# Patient Record
Sex: FEMALE | Race: WHITE | NOT HISPANIC OR LATINO | URBAN - METROPOLITAN AREA
[De-identification: names, ages, dates, MRNs, and addresses within clinical notes are randomized per-mention and may not be internally consistent; named-entity substitution may affect disease eponyms.]

---

## 2020-10-28 ENCOUNTER — OFFICE VISIT (OUTPATIENT)
Dept: URGENT CARE | Age: 35
End: 2020-10-28
Payer: COMMERCIAL

## 2020-10-28 ENCOUNTER — TRANSCRIBE ORDERS (OUTPATIENT)
Dept: URGENT CARE | Age: 35
End: 2020-10-28

## 2020-10-28 VITALS
SYSTOLIC BLOOD PRESSURE: 124 MMHG | HEIGHT: 61 IN | DIASTOLIC BLOOD PRESSURE: 76 MMHG | HEART RATE: 92 BPM | TEMPERATURE: 96.6 F | BODY MASS INDEX: 31.53 KG/M2 | RESPIRATION RATE: 18 BRPM | OXYGEN SATURATION: 97 % | WEIGHT: 167 LBS

## 2020-10-28 DIAGNOSIS — J06.9 VIRAL UPPER RESPIRATORY TRACT INFECTION: Primary | ICD-10-CM

## 2020-10-28 DIAGNOSIS — Z11.59 SPECIAL SCREENING EXAMINATION FOR UNSPECIFIED VIRAL DISEASE: Primary | ICD-10-CM

## 2020-10-28 PROCEDURE — 99203 OFFICE O/P NEW LOW 30 MIN: CPT | Performed by: FAMILY MEDICINE

## 2020-10-28 PROCEDURE — U0003 INFECTIOUS AGENT DETECTION BY NUCLEIC ACID (DNA OR RNA); SEVERE ACUTE RESPIRATORY SYNDROME CORONAVIRUS 2 (SARS-COV-2) (CORONAVIRUS DISEASE [COVID-19]), AMPLIFIED PROBE TECHNIQUE, MAKING USE OF HIGH THROUGHPUT TECHNOLOGIES AS DESCRIBED BY CMS-2020-01-R: HCPCS | Performed by: FAMILY MEDICINE

## 2020-10-28 PROCEDURE — G0382 LEV 3 HOSP TYPE B ED VISIT: HCPCS | Performed by: FAMILY MEDICINE

## 2020-10-28 PROCEDURE — 99283 EMERGENCY DEPT VISIT LOW MDM: CPT | Performed by: FAMILY MEDICINE

## 2020-10-28 RX ORDER — INSULIN GLARGINE 100 [IU]/ML
INJECTION, SOLUTION SUBCUTANEOUS
COMMUNITY

## 2020-10-28 RX ORDER — VENLAFAXINE HYDROCHLORIDE 150 MG/1
150 CAPSULE, EXTENDED RELEASE ORAL DAILY
COMMUNITY

## 2020-10-28 RX ORDER — SIMVASTATIN 20 MG
20 TABLET ORAL
COMMUNITY

## 2020-10-28 RX ORDER — LISINOPRIL 10 MG/1
10 TABLET ORAL DAILY
COMMUNITY

## 2020-10-30 LAB — SARS-COV-2 RNA SPEC QL NAA+PROBE: NOT DETECTED

## 2022-08-12 ENCOUNTER — OFFICE VISIT (OUTPATIENT)
Dept: GASTROENTEROLOGY | Facility: CLINIC | Age: 37
End: 2022-08-12
Payer: COMMERCIAL

## 2022-08-12 VITALS
WEIGHT: 157 LBS | HEIGHT: 61 IN | TEMPERATURE: 98.3 F | SYSTOLIC BLOOD PRESSURE: 120 MMHG | DIASTOLIC BLOOD PRESSURE: 80 MMHG | OXYGEN SATURATION: 98 % | BODY MASS INDEX: 29.64 KG/M2 | HEART RATE: 72 BPM

## 2022-08-12 DIAGNOSIS — R11.0 NAUSEA: ICD-10-CM

## 2022-08-12 DIAGNOSIS — K86.1 OTHER CHRONIC PANCREATITIS (HCC): ICD-10-CM

## 2022-08-12 DIAGNOSIS — K80.20 GALLSTONES: ICD-10-CM

## 2022-08-12 DIAGNOSIS — R10.13 EPIGASTRIC PAIN: ICD-10-CM

## 2022-08-12 DIAGNOSIS — K85.90 RECURRENT PANCREATITIS: Primary | ICD-10-CM

## 2022-08-12 PROCEDURE — 99204 OFFICE O/P NEW MOD 45 MIN: CPT | Performed by: INTERNAL MEDICINE

## 2022-08-12 RX ORDER — PEN NEEDLE, DIABETIC 31 GX5/16"
NEEDLE, DISPOSABLE MISCELLANEOUS
COMMUNITY
Start: 2022-07-19

## 2022-08-12 RX ORDER — PANTOPRAZOLE SODIUM 40 MG/1
40 TABLET, DELAYED RELEASE ORAL DAILY
Qty: 30 TABLET | Refills: 0 | Status: SHIPPED | OUTPATIENT
Start: 2022-08-12 | End: 2022-10-27

## 2022-08-12 RX ORDER — FLASH GLUCOSE SENSOR
KIT MISCELLANEOUS
COMMUNITY
Start: 2022-06-29

## 2022-08-12 RX ORDER — CHLORAL HYDRATE 500 MG
1 CAPSULE ORAL 2 TIMES DAILY
COMMUNITY
Start: 2022-02-08 | End: 2023-02-08

## 2022-08-12 RX ORDER — INSULIN LISPRO 100 [IU]/ML
INJECTION, SOLUTION INTRAVENOUS; SUBCUTANEOUS
COMMUNITY
Start: 2022-07-19

## 2022-08-12 RX ORDER — ATORVASTATIN CALCIUM 40 MG/1
40 TABLET, FILM COATED ORAL
COMMUNITY
Start: 2022-05-08

## 2022-08-12 RX ORDER — ONDANSETRON 4 MG/1
4 TABLET, ORALLY DISINTEGRATING ORAL EVERY 6 HOURS PRN
Qty: 20 TABLET | Refills: 0 | Status: SHIPPED | OUTPATIENT
Start: 2022-08-12 | End: 2022-09-27 | Stop reason: SDUPTHER

## 2022-08-12 NOTE — PATIENT INSTRUCTIONS
Scheduled date of EGD/EUS(as of today):09 16 22  Physician performing EGD:DR VANEGAS [facility]  Location of EGD/EUS:[facility]  Instructions reviewed with patient by:PARAG  Clearances:  N/A  Ok'd by Cari Ordaz

## 2022-08-12 NOTE — PROGRESS NOTES
[facility] - Outpatient Consultation  Diane Hays 39 y o  female MRN: [id number]  Encounter: [id number]          ASSESSMENT AND PLAN:      1  Recurrent pancreatitis  2  Abdominal pain   3  Nausea  4  Cholelithiasis     Patient has had couple of episodes of acute pancreatitis in the last month  No history of alcohol abuse  She does have gallstones  Will check triglyceride and IgG 4 levels  She has lost 10 lb in the last 1-2 months  Will get endoscopy ultrasound in 2-4 weeks evaluate pancreas and ducts  Her to General surgery for cholecystectomy evaluation  Will start patient on omeprazole 40 mg daily for any gastritis/ulceration in the stomach  Give Zofran p r n  for nausea  Normal QTC interval   Will order EGD to evaluate further  RTC 3 months  dAelso Swanson MD  Gastroenterology  [address]  Date: August 12, 2022    - EGD; Future  - Endoscopic ultrasonography, GI (Upper); Future  - ondansetron (Zofran ODT) 4 mg disintegrating tablet; Take 1 tablet (4 mg total) by mouth every 6 (six) hours as needed for nausea or vomiting  Dispense: 20 tablet; Refill: 0  - pantoprazole (PROTONIX) 40 mg tablet; Take 1 tablet (40 mg total) by mouth daily  Dispense: 30 tablet; Refill: 0  - Ambulatory Referral to General Surgery; Future  - IgG 1, 2, 3, and 4; Future  - Triglycerides; Future      ______________________________________________________________________    HPI:  28-year-old with recurrent acute pancreatitis, cholelithiasis, diabetes presents for evaluation  Patient reports that she had 2 episodes of acute pancreatitis in the last 1 month  No etiology was found  She does have gallstones  She says that since the last episode about 2 weeks ago her abdominal pain in the epigastric area is improving  She has made dietary modifications  Her nausea is intermittent    She has had hard to pass stools since the 1st episode of pancreatitis and has been taking stool softeners  No blood in stool  She reports 10 lb weight loss in the last 1-2 month while she has been having low oral intake  No family history of colon or pancreas cancer  Current smoker  Occasional alcohol intake  No NSAID intake  She has been on lisinopril for the last 2 years  Report of CT abdomen without contrast done 2 weeks ago shows acute pancreatitis, pancreatic calcifications, no pancreatic duct or CBD dilation  Cholelithiasis was seen  Labs done during the episode of pancreatitis showed lipase 709, normal LFTs and CBC  REVIEW OF SYSTEMS:    CONSTITUTIONAL: Denies any fever, chills, rigors, and weight loss  HEENT: No earache or tinnitus  Denies hearing loss or visual disturbances  CARDIOVASCULAR: No chest pain or palpitations  RESPIRATORY: Denies any cough, hemoptysis, shortness of breath or dyspnea on exertion  GASTROINTESTINAL: As noted in the History of Present Illness  GENITOURINARY: No problems with urination  Denies any hematuria or dysuria  NEUROLOGIC: No dizziness or vertigo, denies headaches  MUSCULOSKELETAL: Denies any muscle or joint pain  SKIN: Denies skin rashes or itching  ENDOCRINE: Denies excessive thirst  Denies intolerance to heat or cold  PSYCHOSOCIAL: Denies depression or anxiety  Denies any recent memory loss  Historical Information   Past Medical History:   Diagnosis Date    Bell's palsy     Diabetes mellitus ([facility] [address] [facility]     High cholesterol     Hypertension      Past Surgical History:   Procedure Laterality Date    CARPAL TUNNEL RELEASE       SECTION       Social History   Social History     Substance and Sexual Activity   Alcohol Use Yes     Social History     Substance and Sexual Activity   Drug Use Never     Social History     Tobacco Use   Smoking Status Current Every Day Smoker    Packs/day: 0 50   Smokeless Tobacco Never Used     History reviewed  No pertinent family history      Meds/Allergies       Current Outpatient Medications:     atorvastatin (LIPITOR) 40 mg tablet    B-D UF III MINI PEN NEEDLES 31G X 5 MM MISC    Continuous Blood Gluc Sensor (FreeStyle Maxwell 14 Day Sensor) MISC    Diclofenac Sodium (VOLTAREN) 1 %    insulin glargine (LANTUS) 100 units/mL subcutaneous injection    insulin lispro (HumaLOG) 100 units/mL injection pen    Levonorgestrel 20 1 MCG/DAY IUD    lisinopril (ZESTRIL) 10 mg tablet    metFORMIN (GLUCOPHAGE) 1000 MG tablet    Omega-3 Fatty Acids (fish oil) 1,000 mg    ondansetron (Zofran ODT) 4 mg disintegrating tablet    pantoprazole (PROTONIX) 40 mg tablet    simvastatin (ZOCOR) 20 mg tablet    venlafaxine (EFFEXOR-XR) 150 mg 24 hr capsule    Allergies   Allergen Reactions    Penicillins Anaphylaxis, Angioedema, Other (See Comments) and Hives     Childhood rxn  unsure  Apnea      Erythromycin Other (See Comments)     Unknown, per pt can tolerate zpak      Sulfamethoxazole-Trimethoprim Diarrhea and Other (See Comments)     unknown    Bee Pollen Swelling     itching    Grapefruit Extract - Food Allergy Angioedema    Pollen Extract Allergic Rhinitis    Diazepam Rash    Shellfish Allergy - Food Allergy Rash           Objective     Blood pressure 120/80, pulse 72, temperature 98 3 °F (36 8 °C), temperature source Tympanic, height 5' 1" (1 549 m), weight 71 2 kg (157 lb), SpO2 98 %  Body mass index is 29 66 kg/m²  PHYSICAL EXAM:      General Appearance:   Alert, cooperative, no distress   HEENT:   Normocephalic, atraumatic, anicteric      Neck:  Supple, symmetrical, trachea midline   Lungs:   Clear to auscultation bilaterally; no rales, rhonchi or wheezing; respirations unlabored    Heart[de-identified]   Regular rate and rhythm; no murmur, rub, or gallop     Abdomen:   Soft, non-tender, non-distended; normal bowel sounds; no masses, no organomegaly    Genitalia:   Deferred    Rectal:   Deferred    Extremities:  No cyanosis, clubbing or edema    Pulses:  2+ and symmetric    Skin:  No jaundice, rashes, or lesions    Lymph nodes:  No palpable cervical lymphadenopathy        Lab Results:   No visits with results within 1 Day(s) from this visit  Latest known visit with results is:   Transcribe Orders on 10/28/2020   Component Date Value    SARS-CoV-2  10/28/2020 Not Detected          Radiology Results:   No results found

## 2022-08-30 ENCOUNTER — CONSULT (OUTPATIENT)
Dept: SURGERY | Facility: CLINIC | Age: 37
End: 2022-08-30
Payer: COMMERCIAL

## 2022-08-30 VITALS
BODY MASS INDEX: 28.96 KG/M2 | HEART RATE: 79 BPM | SYSTOLIC BLOOD PRESSURE: 108 MMHG | TEMPERATURE: 97.6 F | DIASTOLIC BLOOD PRESSURE: 72 MMHG | HEIGHT: 61 IN | WEIGHT: 153.4 LBS

## 2022-08-30 DIAGNOSIS — K81.9 CHOLECYSTITIS: Primary | ICD-10-CM

## 2022-08-30 PROCEDURE — 99204 OFFICE O/P NEW MOD 45 MIN: CPT | Performed by: SURGERY

## 2022-08-30 NOTE — ASSESSMENT & PLAN NOTE
She has documented gallstones on both CT scan and ultrasound and this is most likely the cause of her pancreatitis seems reasonable to proceed with cholecystectomy  I discussed the procedure in detail including risks, benefits, alternatives, and what to expect postoperatively  Talked to her about doing this either laparoscopically or robotically  I told her we should wait to see what the in scopic ultrasound shows before going ahead  She understands and is agreeable      Plan:  Laparoscopic or robotic cholecystectomy

## 2022-08-30 NOTE — LETTER
August 30, 2022     Kriss Sheridan MD  [address] [place] [address]    Patient: Sarah Dill   YOB: 1985   Date of Visit: 8/30/2022       Dear Dr Jesse Bridges: Thank you for referring Sarah Dill to me for evaluation  Below are my notes for this consultation  If you have questions, please do not hesitate to call me  I look forward to following your patient along with you  Sincerely,        Ashly Shoemaker MD        CC: No Recipients  Ashly Shoemaker MD  8/30/2022 11:34 AM  Sign when Signing Visit  Office Visit - General Surgery  Sarah Dill MRN: [id number]  Encounter: [id number]    Assessment and Plan  Problem List Items Addressed This Visit        Digestive    Cholecystitis - Primary     She has documented gallstones on both CT scan and ultrasound and this is most likely the cause of her pancreatitis seems reasonable to proceed with cholecystectomy  I discussed the procedure in detail including risks, benefits, alternatives, and what to expect postoperatively  Talked to her about doing this either laparoscopically or robotically  I told her we should wait to see what the in scopic ultrasound shows before going ahead  She understands and is agreeable  Plan:  Laparoscopic or robotic cholecystectomy               Chief Complaint:  Sarah Dill is a 39 y o  female who presents for Cholelithiasis (Consult gallbladder )    Subjective  39year old female who has had 2 bouts of pancreatitis in last 3 months  She had been evaluated with CT scans and ultrasounds which shows gallstones  Cholelithiasis is a diagnosis for the pancreatitis  When she is intact severe epigastric through into her back associated with nausea and vomiting  She notices any fatty foods will cause her discomfort at this time    She was recently seen by GI and plans were made for an endoscopic ultrasound to evaluate her pancreas more full    Past Medical History:   Diagnosis Date    Bell's palsy     Diabetes mellitus ([facility] [address] [facility]     High cholesterol     Hypertension        Past Surgical History:   Procedure Laterality Date    CARPAL TUNNEL RELEASE       SECTION  2006       History reviewed  No pertinent family history  Social History     Tobacco Use    Smoking status: Current Every Day Smoker     Packs/day: 0 50    Smokeless tobacco: Never Used   Vaping Use    Vaping Use: Never used   Substance Use Topics    Alcohol use: Yes    Drug use: Never        Medications  Current Outpatient Medications on File Prior to Visit   Medication Sig Dispense Refill    atorvastatin (LIPITOR) 40 mg tablet Take 40 mg by mouth daily with dinner      B-D UF III MINI PEN NEEDLES 31G X 5 MM MISC 5XDAY      Continuous Blood Gluc Sensor (FreeStyle Maxwell 14 Day Sensor) MISC INJECT 1 EACH UNDER THE SKIN EVERY 14 (FOURTEEN) DAYS   Diclofenac Sodium (VOLTAREN) 1 % Apply 2 g topically 4 (four) times a day      insulin glargine (LANTUS) 100 units/mL subcutaneous injection Inject under the skin daily at bedtime      insulin lispro (HumaLOG) 100 units/mL injection pen 12 units three times a day  ISF 20 Target 120      Levonorgestrel 20 1 MCG/DAY IUD 1 each by Intrauterine route      lisinopril (ZESTRIL) 10 mg tablet Take 10 mg by mouth daily      metFORMIN (GLUCOPHAGE) 1000 MG tablet Take 1,000 mg by mouth 2 (two) times a day with meals      Omega-3 Fatty Acids (fish oil) 1,000 mg Take 1 g by mouth 2 (two) times a day      ondansetron (Zofran ODT) 4 mg disintegrating tablet Take 1 tablet (4 mg total) by mouth every 6 (six) hours as needed for nausea or vomiting 20 tablet 0    pantoprazole (PROTONIX) 40 mg tablet Take 1 tablet (40 mg total) by mouth daily 30 tablet 0    simvastatin (ZOCOR) 20 mg tablet Take 20 mg by mouth daily at bedtime      venlafaxine (EFFEXOR-XR) 150 mg 24 hr capsule Take 150 mg by mouth daily       No current facility-administered medications on file prior to visit  Allergies  Allergies   Allergen Reactions    Penicillins Anaphylaxis, Angioedema, Other (See Comments) and Hives     Childhood rxn  unsure  Apnea      Erythromycin Other (See Comments)     Unknown, per pt can tolerate zpak      Sulfamethoxazole-Trimethoprim Diarrhea and Other (See Comments)     unknown    Bee Pollen Swelling     itching    Grapefruit Extract - Food Allergy Angioedema    Pollen Extract Allergic Rhinitis    Diazepam Rash    Shellfish Allergy - Food Allergy Rash       Review of Systems   Constitutional: Negative for chills and fever  HENT: Negative for trouble swallowing and voice change  Eyes: Negative for pain and visual disturbance  Respiratory: Negative for cough and shortness of breath  Cardiovascular: Negative for chest pain and leg swelling  Gastrointestinal: Negative for abdominal pain, nausea and vomiting  Endocrine: Negative for cold intolerance, heat intolerance, polydipsia, polyphagia and polyuria  Genitourinary: Negative for difficulty urinating and flank pain  Musculoskeletal: Negative for arthralgias and gait problem  Skin: Negative for rash and wound  Allergic/Immunologic: Negative for food allergies  Neurological: Negative for seizures, syncope, weakness and headaches  Hematological: Negative for adenopathy  Psychiatric/Behavioral: Negative for confusion  All other systems reviewed and are negative  Objective  Vitals:    08/30/22 0939   BP: 108/72   Pulse: 79   Temp: 97 6 °F (36 4 °C)       Physical Exam  Vitals and nursing note reviewed  Constitutional:       General: She is not in acute distress  Appearance: She is well-developed  She is not diaphoretic  HENT:      Head: Normocephalic and atraumatic  Right Ear: External ear normal       Left Ear: External ear normal    Eyes:      General: No scleral icterus  Conjunctiva/sclera: Conjunctivae normal    Neck:      Thyroid: No thyromegaly        Trachea: No tracheal deviation  Cardiovascular:      Rate and Rhythm: Normal rate and regular rhythm  Heart sounds: Normal heart sounds  No murmur heard  No friction rub  Pulmonary:      Effort: Pulmonary effort is normal  No respiratory distress  Breath sounds: Normal breath sounds  No wheezing or rales  Abdominal:      General: There is no distension  Palpations: Abdomen is soft  There is no mass  Tenderness: There is no abdominal tenderness  There is no guarding or rebound  Musculoskeletal:         General: Normal range of motion  Cervical back: Neck supple  Lymphadenopathy:      Cervical: No cervical adenopathy  Skin:     General: Skin is warm and dry  Neurological:      Mental Status: She is alert and oriented to person, place, and time  Psychiatric:         Behavior: Behavior normal          Thought Content:  Thought content normal          Judgment: Judgment normal

## 2022-08-30 NOTE — PROGRESS NOTES
Office Visit - General Surgery  Jose Antonio Hernandez MRN: [id number]  Encounter: [id number]    Assessment and Plan  Problem List Items Addressed This Visit        Digestive    Cholecystitis - Primary     She has documented gallstones on both CT scan and ultrasound and this is most likely the cause of her pancreatitis seems reasonable to proceed with cholecystectomy  I discussed the procedure in detail including risks, benefits, alternatives, and what to expect postoperatively  Talked to her about doing this either laparoscopically or robotically  I told her we should wait to see what the in scopic ultrasound shows before going ahead  She understands and is agreeable  Plan:  Laparoscopic or robotic cholecystectomy               Chief Complaint:  Jose Antonio Hernandez is a 39 y o  female who presents for Cholelithiasis (Consult gallbladder )    Subjective  39year old female who has had 2 bouts of pancreatitis in last 3 months  She had been evaluated with CT scans and ultrasounds which shows gallstones  Cholelithiasis is a diagnosis for the pancreatitis  When she is intact severe epigastric through into her back associated with nausea and vomiting  She notices any fatty foods will cause her discomfort at this time  She was recently seen by GI and plans were made for an endoscopic ultrasound to evaluate her pancreas more full    Past Medical History:   Diagnosis Date    Bell's palsy     Diabetes mellitus ([facility] [address]     High cholesterol     Hypertension        Past Surgical History:   Procedure Laterality Date    CARPAL TUNNEL RELEASE       SECTION         History reviewed  No pertinent family history  Social History     Tobacco Use    Smoking status: Current Every Day Smoker     Packs/day: 0 50    Smokeless tobacco: Never Used   Vaping Use    Vaping Use: Never used   Substance Use Topics    Alcohol use:  Yes    Drug use: Never        Medications  Current Outpatient Medications on File Prior to Visit Medication Sig Dispense Refill    atorvastatin (LIPITOR) 40 mg tablet Take 40 mg by mouth daily with dinner      B-D UF III MINI PEN NEEDLES 31G X 5 MM MISC 5XDAY      Continuous Blood Gluc Sensor (FreeStyle Maxwell 14 Day Sensor) MISC INJECT 1 EACH UNDER THE SKIN EVERY 14 (FOURTEEN) DAYS   Diclofenac Sodium (VOLTAREN) 1 % Apply 2 g topically 4 (four) times a day      insulin glargine (LANTUS) 100 units/mL subcutaneous injection Inject under the skin daily at bedtime      insulin lispro (HumaLOG) 100 units/mL injection pen 12 units three times a day  ISF 20 Target 120      Levonorgestrel 20 1 MCG/DAY IUD 1 each by Intrauterine route      lisinopril (ZESTRIL) 10 mg tablet Take 10 mg by mouth daily      metFORMIN (GLUCOPHAGE) 1000 MG tablet Take 1,000 mg by mouth 2 (two) times a day with meals      Omega-3 Fatty Acids (fish oil) 1,000 mg Take 1 g by mouth 2 (two) times a day      ondansetron (Zofran ODT) 4 mg disintegrating tablet Take 1 tablet (4 mg total) by mouth every 6 (six) hours as needed for nausea or vomiting 20 tablet 0    pantoprazole (PROTONIX) 40 mg tablet Take 1 tablet (40 mg total) by mouth daily 30 tablet 0    simvastatin (ZOCOR) 20 mg tablet Take 20 mg by mouth daily at bedtime      venlafaxine (EFFEXOR-XR) 150 mg 24 hr capsule Take 150 mg by mouth daily       No current facility-administered medications on file prior to visit         Allergies  Allergies   Allergen Reactions    Penicillins Anaphylaxis, Angioedema, Other (See Comments) and Hives     Childhood rxn  unsure  Apnea      Erythromycin Other (See Comments)     Unknown, per pt can tolerate zpak      Sulfamethoxazole-Trimethoprim Diarrhea and Other (See Comments)     unknown    Bee Pollen Swelling     itching    Grapefruit Extract - Food Allergy Angioedema    Pollen Extract Allergic Rhinitis    Diazepam Rash    Shellfish Allergy - Food Allergy Rash       Review of Systems   Constitutional: Negative for chills and fever    HENT: Negative for trouble swallowing and voice change  Eyes: Negative for pain and visual disturbance  Respiratory: Negative for cough and shortness of breath  Cardiovascular: Negative for chest pain and leg swelling  Gastrointestinal: Negative for abdominal pain, nausea and vomiting  Endocrine: Negative for cold intolerance, heat intolerance, polydipsia, polyphagia and polyuria  Genitourinary: Negative for difficulty urinating and flank pain  Musculoskeletal: Negative for arthralgias and gait problem  Skin: Negative for rash and wound  Allergic/Immunologic: Negative for food allergies  Neurological: Negative for seizures, syncope, weakness and headaches  Hematological: Negative for adenopathy  Psychiatric/Behavioral: Negative for confusion  All other systems reviewed and are negative  Objective  Vitals:    08/30/22 0939   BP: 108/72   Pulse: 79   Temp: 97 6 °F (36 4 °C)       Physical Exam  Vitals and nursing note reviewed  Constitutional:       General: She is not in acute distress  Appearance: She is well-developed  She is not diaphoretic  HENT:      Head: Normocephalic and atraumatic  Right Ear: External ear normal       Left Ear: External ear normal    Eyes:      General: No scleral icterus  Conjunctiva/sclera: Conjunctivae normal    Neck:      Thyroid: No thyromegaly  Trachea: No tracheal deviation  Cardiovascular:      Rate and Rhythm: Normal rate and regular rhythm  Heart sounds: Normal heart sounds  No murmur heard  No friction rub  Pulmonary:      Effort: Pulmonary effort is normal  No respiratory distress  Breath sounds: Normal breath sounds  No wheezing or rales  Abdominal:      General: There is no distension  Palpations: Abdomen is soft  There is no mass  Tenderness: There is no abdominal tenderness  There is no guarding or rebound  Musculoskeletal:         General: Normal range of motion        Cervical back: Neck supple  Lymphadenopathy:      Cervical: No cervical adenopathy  Skin:     General: Skin is warm and dry  Neurological:      Mental Status: She is alert and oriented to person, place, and time  Psychiatric:         Behavior: Behavior normal          Thought Content:  Thought content normal          Judgment: Judgment normal

## 2022-09-01 LAB — HBA1C MFR BLD HPLC: 9.7 %

## 2022-09-13 ENCOUNTER — TELEPHONE (OUTPATIENT)
Dept: OTHER | Facility: OTHER | Age: 37
End: 2022-09-13

## 2022-09-13 NOTE — TELEPHONE ENCOUNTER
Patient is calling to have a letter for her employer sent to:  [email or web address] with absence for date of gall bladder surgery and running for 2 weeks after surgery until her follow up appt

## 2022-09-16 ENCOUNTER — ANESTHESIA EVENT (OUTPATIENT)
Dept: GASTROENTEROLOGY | Facility: HOSPITAL | Age: 37
End: 2022-09-16

## 2022-09-16 ENCOUNTER — HOSPITAL ENCOUNTER (OUTPATIENT)
Dept: GASTROENTEROLOGY | Facility: HOSPITAL | Age: 37
Setting detail: OUTPATIENT SURGERY
End: 2022-09-16
Attending: INTERNAL MEDICINE
Payer: COMMERCIAL

## 2022-09-16 ENCOUNTER — ANESTHESIA (OUTPATIENT)
Dept: GASTROENTEROLOGY | Facility: HOSPITAL | Age: 37
End: 2022-09-16

## 2022-09-16 VITALS
OXYGEN SATURATION: 96 % | DIASTOLIC BLOOD PRESSURE: 65 MMHG | HEART RATE: 81 BPM | RESPIRATION RATE: 18 BRPM | TEMPERATURE: 96.1 F | SYSTOLIC BLOOD PRESSURE: 92 MMHG

## 2022-09-16 DIAGNOSIS — K85.90 RECURRENT PANCREATITIS: Primary | ICD-10-CM

## 2022-09-16 DIAGNOSIS — R11.0 NAUSEA: ICD-10-CM

## 2022-09-16 DIAGNOSIS — R10.13 EPIGASTRIC PAIN: ICD-10-CM

## 2022-09-16 DIAGNOSIS — K85.90 RECURRENT PANCREATITIS: ICD-10-CM

## 2022-09-16 DIAGNOSIS — K80.20 GALLSTONES: ICD-10-CM

## 2022-09-16 DIAGNOSIS — K86.1 OTHER CHRONIC PANCREATITIS (HCC): Primary | ICD-10-CM

## 2022-09-16 PROBLEM — E78.5 HYPERLIPIDEMIA ASSOCIATED WITH TYPE 2 DIABETES MELLITUS (HCC): Status: ACTIVE | Noted: 2021-03-30

## 2022-09-16 PROBLEM — G56.01 CARPAL TUNNEL SYNDROME OF RIGHT WRIST: Status: ACTIVE | Noted: 2018-02-23

## 2022-09-16 PROBLEM — M54.81 BILATERAL OCCIPITAL NEURALGIA: Status: ACTIVE | Noted: 2021-10-04

## 2022-09-16 PROBLEM — L65.9 ALOPECIA: Status: ACTIVE | Noted: 2019-01-08

## 2022-09-16 PROBLEM — G51.0 LEFT-SIDED BELL'S PALSY: Status: ACTIVE | Noted: 2017-04-20

## 2022-09-16 PROBLEM — E11.69 HYPERLIPIDEMIA ASSOCIATED WITH TYPE 2 DIABETES MELLITUS (HCC): Status: ACTIVE | Noted: 2021-03-30

## 2022-09-16 LAB
EXT PREGNANCY TEST URINE: NEGATIVE
EXT. CONTROL: NORMAL
GLUCOSE SERPL-MCNC: 133 MG/DL (ref 65–140)

## 2022-09-16 PROCEDURE — 81025 URINE PREGNANCY TEST: CPT | Performed by: INTERNAL MEDICINE

## 2022-09-16 PROCEDURE — 82948 REAGENT STRIP/BLOOD GLUCOSE: CPT

## 2022-09-16 RX ORDER — SODIUM CHLORIDE 9 MG/ML
125 INJECTION, SOLUTION INTRAVENOUS CONTINUOUS
Status: DISCONTINUED | OUTPATIENT
Start: 2022-09-16 | End: 2022-09-20 | Stop reason: HOSPADM

## 2022-09-16 RX ORDER — FENTANYL CITRATE 50 UG/ML
INJECTION, SOLUTION INTRAMUSCULAR; INTRAVENOUS AS NEEDED
Status: DISCONTINUED | OUTPATIENT
Start: 2022-09-16 | End: 2022-09-16

## 2022-09-16 RX ORDER — PROPOFOL 10 MG/ML
INJECTION, EMULSION INTRAVENOUS CONTINUOUS PRN
Status: DISCONTINUED | OUTPATIENT
Start: 2022-09-16 | End: 2022-09-16

## 2022-09-16 RX ORDER — SODIUM CHLORIDE 9 MG/ML
125 INJECTION, SOLUTION INTRAVENOUS CONTINUOUS
Status: CANCELLED | OUTPATIENT
Start: 2022-09-16

## 2022-09-16 RX ORDER — SODIUM CHLORIDE 9 MG/ML
INJECTION, SOLUTION INTRAVENOUS CONTINUOUS PRN
Status: DISCONTINUED | OUTPATIENT
Start: 2022-09-16 | End: 2022-09-16

## 2022-09-16 RX ORDER — PROPOFOL 10 MG/ML
INJECTION, EMULSION INTRAVENOUS AS NEEDED
Status: DISCONTINUED | OUTPATIENT
Start: 2022-09-16 | End: 2022-09-16

## 2022-09-16 RX ADMIN — SODIUM CHLORIDE: 0.9 INJECTION, SOLUTION INTRAVENOUS at 10:34

## 2022-09-16 RX ADMIN — FENTANYL CITRATE 50 MCG: 50 INJECTION INTRAMUSCULAR; INTRAVENOUS at 10:37

## 2022-09-16 RX ADMIN — PROPOFOL 150 MCG/KG/MIN: 10 INJECTION, EMULSION INTRAVENOUS at 10:37

## 2022-09-16 RX ADMIN — LIDOCAINE HYDROCHLORIDE 100 MG: 20 INJECTION INTRAVENOUS at 10:37

## 2022-09-16 RX ADMIN — FENTANYL CITRATE 50 MCG: 50 INJECTION INTRAMUSCULAR; INTRAVENOUS at 10:44

## 2022-09-16 RX ADMIN — PROPOFOL 100 MG: 10 INJECTION, EMULSION INTRAVENOUS at 10:37

## 2022-09-16 NOTE — DISCHARGE INSTRUCTIONS
Upper Endoscopic Gastrointestinal Ultrasonography   WHAT YOU NEED TO KNOW:   An upper gastrointestinal endoscopic ultrasound is done to look at the different parts of your upper gastrointestinal (GI) tract  The upper GI tract includes the esophagus, stomach, and duodenum (first part of the small intestine)  This procedure is used to help diagnose and treat diseases that affect the upper GI tract  DISCHARGE INSTRUCTIONS:   Seek care immediately if:   You have sudden, severe abdominal pain  You have problems swallowing  You have a large amount of black, sticky bowel movements or blood in your bowel movements  You have sudden trouble breathing  You feel weak, lightheaded, or faint or your heart beats faster than normal for you  Contact your healthcare provider if:   You have a fever and chills  You have nausea or are vomiting  Your abdomen is bloated or feels full and hard  You have abdominal pain  You have a large amount of black, sticky bowel movements or blood in your bowel movements  You have not had a bowel movement for 3 days after your procedure  You have rash or hives  You lose your appetite, your skin feels itchy, and your skin turns yellow  You have questions or concerns about your procedure  Self-care:   ·      Rest when you feel it is needed  You may be drowsy for up to 24 hours after your procedure  Return to your daily activities as directed  ·       Ask when you can eat regular foods  Healthy foods include fruits, vegetables, whole-grain breads, low-fat dairy products, beans, lean meats, and fish  ·       Relieve a sore throat with ice chips, liquids, or lozenges as directed  Follow up with your healthcare provider as directed: Write down your questions so you remember to ask them during your visits        If you take a blood thinner, please review the specific instructions from your endoscopist about when you should resume it  These can be found in the Recommendation and Your Medication list sections of this After Visit Summary

## 2022-09-16 NOTE — ANESTHESIA POSTPROCEDURE EVALUATION
Post-Op Assessment Note    CV Status:  Stable  Pain Score: 0    Pain management: adequate     Mental Status:  Alert and awake   Hydration Status:  Euvolemic and stable   PONV Controlled:  Controlled   Airway Patency:  Patent and adequate      Post Op Vitals Reviewed: Yes      Staff: CRNA         No complications documented      BP 90/51 (09/16/22 1106)    Temp (!) 96 1 °F (35 6 °C) (09/16/22 1106)    Pulse 77 (09/16/22 1106)   Resp 18 (09/16/22 1106)    SpO2 92 % (09/16/22 1106)

## 2022-09-16 NOTE — ANESTHESIA PREPROCEDURE EVALUATION
Procedure:  EGD  ENDOSCOPIC ULTRASOUND (UPPER)     - denies any chest pain, palpitations, shortness of breath, syncope, lightheadedness, seizures   - denies any recent infectious symptoms such as fevers, chills, cough   - denies taking any anticoagulation medications or any issues with bleeding, bruising, clotting      Relevant Problems   ANESTHESIA (within normal limits)      CARDIO   (+) Hyperlipidemia associated with type 2 diabetes mellitus (HCC)   (+) Migraine without aura      ENDO (within normal limits)      GI/HEPATIC   (+) Other chronic pancreatitis (HCC)      /RENAL (within normal limits)      GYN (within normal limits)      HEMATOLOGY (within normal limits)      MUSCULOSKELETAL   (+) Backache      NEURO/PSYCH   (+) Anxiety state   (+) Depressive disorder   (+) Migraine without aura      PULMONARY (within normal limits)      Endocrine   (+) Uncontrolled type 2 diabetes mellitus      Nervous and Auditory   (+) Left-sided Bell's palsy        Physical Exam    Airway    Mallampati score: I  TM Distance: >3 FB  Neck ROM: full     Dental       Cardiovascular  Rhythm: regular, Rate: normal, Cardiovascular exam normal    Pulmonary  Pulmonary exam normal Breath sounds clear to auscultation,     Other Findings        Anesthesia Plan  ASA Score- 2     Anesthesia Type- IV sedation with anesthesia with ASA Monitors  Additional Monitors:   Airway Plan:     Comment: NC with CO2 monitoring  Plan Factors-Exercise tolerance (METS): >4 METS  Chart reviewed  EKG reviewed  Imaging results reviewed  Existing labs reviewed  Patient summary reviewed  Patient is a current smoker  Patient did not smoke on day of surgery  Obstructive sleep apnea risk education given perioperatively  Induction- intravenous  Postoperative Plan-     Informed Consent- Anesthetic plan and risks discussed with patient  I personally reviewed this patient with the CRNA   Discussed and agreed on the Anesthesia Plan with the CRNA  [place] [facility]

## 2022-09-23 ENCOUNTER — TELEPHONE (OUTPATIENT)
Dept: SURGERY | Facility: CLINIC | Age: 37
End: 2022-09-23

## 2022-09-23 NOTE — TELEPHONE ENCOUNTER
Dr Zach Monsalve ordered the MRI the diagnosis code has it for Pancreatitis  Dr Kurtis Osorio is speaking with PAT doctor, Stanton Xiong and informed Yasmin that Zach Monsalve ordered the MRI and we will see what the results show and go from there  Yes her A1C & BS are high and was told to see her endocrinologist to get them under control

## 2022-09-23 NOTE — TELEPHONE ENCOUNTER
Dr Sheldon Rodriguez contacted Dr Lacie Cook and myself regarding patient A1C and BS are not under controlled and her surgery on 9/29/22 would have to be rescheduled  Dr Lacie Cook replied stating Angus Phan ordered her a MRI/ pancreatitis which is scheduled on 9/27/22 and we would go from there to determine if patient can proceed  Patient was informed and undestands

## 2022-09-23 NOTE — TELEPHONE ENCOUNTER
Patient is calling back because she states she would like to know more details regarding canceling the surgery due to her BS and A1C are not under control  She also would like to know further about the MRI what you are looking for and how will that affect surgery

## 2022-09-26 ENCOUNTER — TELEPHONE (OUTPATIENT)
Dept: OTHER | Facility: OTHER | Age: 37
End: 2022-09-26

## 2022-09-26 NOTE — TELEPHONE ENCOUNTER
Spoke with general surgery and pt  Explained to pt to have MRI done tomorrow  Explained to pt PAT blood work showed elevated A1c and blood sugar   Instructed her to discuss with general surgery going forward

## 2022-09-26 NOTE — TELEPHONE ENCOUNTER
Patient stated she is scheduled for surgery on 9/29/2022  Patient is requesting a call back from the office to discuss additional questions prior to procedure  Patient indicated that her blood work came back abnormal and would like to discuss

## 2022-09-27 ENCOUNTER — TELEPHONE (OUTPATIENT)
Dept: SURGERY | Facility: CLINIC | Age: 37
End: 2022-09-27

## 2022-09-27 ENCOUNTER — TELEPHONE (OUTPATIENT)
Dept: GASTROENTEROLOGY | Facility: CLINIC | Age: 37
End: 2022-09-27

## 2022-09-27 DIAGNOSIS — K86.1 OTHER CHRONIC PANCREATITIS (HCC): ICD-10-CM

## 2022-09-27 DIAGNOSIS — K80.20 GALLSTONES: ICD-10-CM

## 2022-09-27 DIAGNOSIS — K85.90 RECURRENT PANCREATITIS: ICD-10-CM

## 2022-09-27 DIAGNOSIS — R10.13 EPIGASTRIC PAIN: ICD-10-CM

## 2022-09-27 DIAGNOSIS — R11.0 NAUSEA: ICD-10-CM

## 2022-09-27 RX ORDER — ONDANSETRON 4 MG/1
4 TABLET, ORALLY DISINTEGRATING ORAL EVERY 6 HOURS PRN
Qty: 20 TABLET | Refills: 0 | Status: SHIPPED | OUTPATIENT
Start: 2022-09-27

## 2022-09-27 NOTE — TELEPHONE ENCOUNTER
Spoke with pt and I explained reason for cancellation of surgery should be discuss with general surgery  Pt stated she is aware but wanted to discuss with us her current symptoms and get recommendations  Pt has been having abdominal pain since last night  Although abdominal pain is mild, pt is concerned it will worsen like in the past when she had pancreatitis  She also mentions nausea but denies fever or vomiting  Pt has been having constipation  Last BM was today but it was small and before today's BM, she had not had BM for about 4 days  I inquired if pt was taking creon, she confirmed she is  I advised on miralax  I also advised on zofran for nausea and pt requested new script  RX sent  Lastly, I recommended liquid diet in case this was the beginning of a pancreatitis flare  Pt is agreeable   I reviewed alarm symptoms that would prompt ED evaluation

## 2022-09-27 NOTE — TELEPHONE ENCOUNTER
Pt is requesting an urgent call back, pt states that her surgery was canceled and isn't clear why because she is still experiencing abdominal and back pain as well as nausea  Please give pt a call back as soon as possible

## 2022-09-27 NOTE — TELEPHONE ENCOUNTER
Informed patient that we are canceling her surgery due to her elevated A1C & blood surgery  Patient is seeing her Endocrinologist to f/u

## 2022-10-12 ENCOUNTER — TELEPHONE (OUTPATIENT)
Dept: GASTROENTEROLOGY | Facility: CLINIC | Age: 37
End: 2022-10-12

## 2022-10-12 NOTE — TELEPHONE ENCOUNTER
The prior authorization request for this study has been denied  The insurance determined the following:     Does not meet Medical Necessity       Please notify your patient of insurance denial and follow up with the patient for next steps in their treatment plan   Notify central scheduling by calling [phone] to cancel appointment and close this order in Epic

## 2022-10-13 NOTE — TELEPHONE ENCOUNTER
Based on referral info: AUTH# [id number] [address] VALID 9/26/22-3/25/23 FOR [facility]    Pt did not have imaging done (no show)

## 2022-10-19 ENCOUNTER — NURSE TRIAGE (OUTPATIENT)
Dept: OTHER | Facility: OTHER | Age: 37
End: 2022-10-19

## 2022-10-19 NOTE — TELEPHONE ENCOUNTER
Regarding: Constant Stomach Pains  ----- Message from Iván Martinez sent at 10/19/2022 10:56 AM EDT -----  "I have been having a constant pain in my stomach that's progressively getting worse, especially after eating "

## 2022-10-19 NOTE — TELEPHONE ENCOUNTER
Patient called in to report  moderate abdominal pain that starts in the center and moves out to her sides and back  Patient reports having this pain for several months that is aggravated with eating and movement  Also reports nausea with vomiting x 1 this AM and gas  Patient reports having a Cholecystectomy 9/29/22 at [address] [place] [address]  Please follow up with patient  Reason for Disposition  • MODERATE pain (e g , interferes with normal activities that comes and goes (cramps) lasts > 24 hours (Exception: pain with Vomiting or Diarrhea - see that Protocol)    Answer Assessment - Initial Assessment Questions  1  LOCATION: "Where does it hurt?"       Center abdomen and spreads out from there    2  RADIATION: "Does the pain shoot anywhere else?" (e g , chest, back)      Toward the back and out to the sides    3  ONSET: "When did the pain begin?" (e g , minutes, hours or days ago)       Months    4  SUDDEN: "Gradual or sudden onset?"      Sudden onset    5  PATTERN "Does the pain come and go, or is it constant?"     - If constant: "Is it getting better, staying the same, or worsening?"       (Note: Constant means the pain never goes away completely; most serious pain is constant and it progresses)      - If intermittent: "How long does it last?" "Do you have pain now?"      (Note: Intermittent means the pain goes away completely between bouts)      Constant; less if patient sleeps through the night; worsens with movement    6  SEVERITY: "How bad is the pain?"  (e g , Scale 1-10; mild, moderate, or severe)    - MILD (1-3): doesn't interfere with normal activities, abdomen soft and not tender to touch     - MODERATE (4-7): interferes with normal activities or awakens from sleep, tender to touch     - SEVERE (8-10): excruciating pain, doubled over, unable to do any normal activities       Moderate (6) to severe post eating (8-9)    7   RECURRENT SYMPTOM: "Have you ever had this type of stomach pain before?" If Yes, ask: "When was the last time?" and "What happened that time?"       Yes, last year that lasted a couple of weeks    8  CAUSE: "What do you think is causing the stomach pain?"      Not sure; maybe her pancreas    9  RELIEVING/AGGRAVATING FACTORS: "What makes it better or worse?" (e g , movement, antacids, bowel movement)      Eating and movement aggravate pain; just laying down for a couple hours will decrease the pain    10  OTHER SYMPTOMS: "Has there been any vomiting, diarrhea, constipation, or urine problems?"        Nausea with vomiting this AM (bile emesis); gas with belching and flatus, last BM 10/17; tolerating fluids with sips; small meals/crackers    11   PREGNANCY: "Is there any chance you are pregnant?" "When was your last menstrual period?"        Denies    Protocols used: ABDOMINAL PAIN - [facility] - [facility]

## 2022-10-20 NOTE — TELEPHONE ENCOUNTER
I would like to see patient in office next week  2:00 p m  appointment slot available on Thursday  Please kindly arrange of agreeable with patient

## 2022-10-27 ENCOUNTER — OFFICE VISIT (OUTPATIENT)
Dept: GASTROENTEROLOGY | Facility: CLINIC | Age: 37
End: 2022-10-27

## 2022-10-27 VITALS
BODY MASS INDEX: 29.57 KG/M2 | SYSTOLIC BLOOD PRESSURE: 112 MMHG | HEIGHT: 61 IN | WEIGHT: 156.6 LBS | DIASTOLIC BLOOD PRESSURE: 68 MMHG | TEMPERATURE: 97.8 F

## 2022-10-27 DIAGNOSIS — K85.90 RECURRENT PANCREATITIS: ICD-10-CM

## 2022-10-27 DIAGNOSIS — K86.1 OTHER CHRONIC PANCREATITIS (HCC): ICD-10-CM

## 2022-10-27 DIAGNOSIS — K59.09 CHRONIC CONSTIPATION: ICD-10-CM

## 2022-10-27 DIAGNOSIS — R68.81 EARLY SATIETY: Primary | ICD-10-CM

## 2022-10-27 DIAGNOSIS — R10.13 EPIGASTRIC PAIN: ICD-10-CM

## 2022-10-27 DIAGNOSIS — R11.0 NAUSEA: ICD-10-CM

## 2022-10-27 DIAGNOSIS — K80.20 GALLSTONES: ICD-10-CM

## 2022-10-27 RX ORDER — POLYETHYLENE GLYCOL 3350 17 G/17G
17 POWDER, FOR SOLUTION ORAL DAILY
Qty: 510 G | Refills: 5 | Status: SHIPPED | OUTPATIENT
Start: 2022-10-27 | End: 2023-04-25

## 2022-10-27 RX ORDER — METFORMIN HYDROCHLORIDE 500 MG/1
1000 TABLET, EXTENDED RELEASE ORAL 2 TIMES DAILY WITH MEALS
COMMUNITY
Start: 2022-09-05

## 2022-10-27 RX ORDER — PANTOPRAZOLE SODIUM 40 MG/1
40 TABLET, DELAYED RELEASE ORAL
Qty: 60 TABLET | Refills: 5 | Status: SHIPPED | OUTPATIENT
Start: 2022-10-27 | End: 2023-04-25

## 2022-10-27 NOTE — PROGRESS NOTES
[facility] - Outpatient Follow-up Note  Diane Hays 39 y o  female MRN: [id number]  Encounter: [id number]          ASSESSMENT AND PLAN:      1  Recurrent pancreatitis  2  Chronic pancreatitis  3  Nausea  4  Abdominal pain  5  Bloating   6  Chronic constipation    Patient continues to be symptomatic  EGD did not show significant disease  EUS/MRCP consistent with chronic pancreatitis  Patient on Creon and PPI  Differentials include gastroparesis or upper GI symptoms secondary to uncontrolled constipation  Will get gastric emptying study  Start patient on MiraLax with goal to have 2-3 soft to loose bowel movements per day  Increase pantoprazole dose to twice daily  Patient agreeable with plan of care  RTC 4 months  Merna Ramos MD  Gastroenterology  [place] [address]  Date: October 27, 2022      - pantoprazole (PROTONIX) 40 mg tablet; Take 1 tablet (40 mg total) by mouth 2 (two) times a day before meals  Dispense: 60 tablet; Refill: 5  - polyethylene glycol (MIRALAX) 17 g packet; Take 17 g by mouth daily  Dispense: 510 g; Refill: 5  - bisacodyl (FLEET) 10 MG/30ML ENEM; Insert 30 mL (10 mg total) into the rectum once for 1 dose  Dispense: 30 mL; Refill: 0      ______________________________________________________________________    SUBJECTIVE:  43-year-old with recurrent pancreatitis, abdominal pain, nausea, and cholelithiasis status post cholecystectomy presents for follow-up  Last visit patient reported chronic GI symptoms  She has undergone cholecystectomy since then  EUS was ordered which showed chronic pancreatitis confirmed with MRI later done at outside hospital   Patient was started on Creon  Continue on pantoprazole 40 milligrams daily  Zofran as needed  EGD did not show any significant disease  Patient returns today  She reports that she still has nausea and abdominal pain  Symptoms worse after meals    She also has early satiety  She feels bloated  She is taking Creon and pantoprazole but medications have not helped much  She also has 1-2 bowel movements per week which are hard to pass  This has been going on for the last 1 year  No blood in stool  REVIEW OF SYSTEMS IS OTHERWISE NEGATIVE  Historical Information   Past Medical History:   Diagnosis Date   • Alopecia    • Bell's palsy    • Cholecystitis    • Chronic pancreatitis ([facility] [address] )    • [facility] )    • High cholesterol    • Hypertension      Past Surgical History:   Procedure Laterality Date   • CARPAL TUNNEL RELEASE     •  SECTION     • UPPER GASTROINTESTINAL ENDOSCOPY       Social History   Social History     Substance and Sexual Activity   Alcohol Use Yes    Comment: SOCIAL     Social History     Substance and Sexual Activity   Drug Use Never     Social History     Tobacco Use   Smoking Status Current Every Day Smoker   • Packs/day: 0 50   Smokeless Tobacco Never Used     History reviewed  No pertinent family history      Meds/Allergies       Current Outpatient Medications:   •  atorvastatin (LIPITOR) 40 mg tablet  •  B-D UF III MINI PEN NEEDLES 31G X 5 MM MISC  •  bisacodyl (FLEET) 10 MG/30ML ENEM  •  Continuous Blood Gluc Sensor (FreeStyle Maxwell 14 Day Sensor) MISC  •  Diclofenac Sodium (VOLTAREN) 1 %  •  insulin glargine (LANTUS) 100 units/mL subcutaneous injection  •  insulin lispro (HumaLOG) 100 units/mL injection pen  •  Levonorgestrel 20 1 MCG/DAY IUD  •  lisinopril (ZESTRIL) 10 mg tablet  •  metFORMIN (GLUCOPHAGE) 1000 MG tablet  •  Omega-3 Fatty Acids (fish oil) 1,000 mg  •  ondansetron (Zofran ODT) 4 mg disintegrating tablet  •  pancrelipase, Lip-Prot-Amyl, (CREON) 24,000 units  •  pantoprazole (PROTONIX) 40 mg tablet  •  polyethylene glycol (MIRALAX) 17 g packet  •  simvastatin (ZOCOR) 20 mg tablet  •  venlafaxine (EFFEXOR-XR) 150 mg 24 hr capsule  •  metFORMIN (GLUCOPHAGE-XR) 500 mg 24 hr tablet    Allergies   Allergen Reactions • Grapefruit Extract - Food Allergy Angioedema   • Penicillins Anaphylaxis, Angioedema, Other (See Comments) and Hives     Childhood rxn  unsure  Apnea     • Bee Pollen Swelling     itching   • Erythromycin Other (See Comments)     Unknown, per pt can tolerate zpak     • Sulfamethoxazole-Trimethoprim Diarrhea and Other (See Comments)     unknown   • Toradol [Ketorolac Tromethamine] Other (See Comments)     MUSCLE SPASM   • Tramadol GI Intolerance   • Diazepam Rash   • Pollen Extract Allergic Rhinitis   • Shellfish Allergy - Food Allergy Rash           Objective     Blood pressure 112/68, temperature 97 8 °F (36 6 °C), temperature source Tympanic, height 5' 1" (1 549 m), weight 71 kg (156 lb 9 6 oz)  Body mass index is 29 59 kg/m²  PHYSICAL EXAM:      General Appearance:   Alert, cooperative, no distress   HEENT:   Normocephalic, atraumatic, anicteric      Neck:  Supple, symmetrical, trachea midline   Lungs:   Clear to auscultation bilaterally; no rales, rhonchi or wheezing; respirations unlabored    Heart[de-identified]   Regular rate and rhythm; no murmur, rub, or gallop  Abdomen:   Soft, non-tender, non-distended; normal bowel sounds; no masses, no organomegaly    Genitalia:   Deferred    Rectal:   Deferred    Extremities:  No cyanosis, clubbing or edema    Pulses:  2+ and symmetric    Skin:  No jaundice, rashes, or lesions    Lymph nodes:  No palpable cervical lymphadenopathy        Lab Results:   No visits with results within 1 Day(s) from this visit  Latest known visit with results is:   Hospital Outpatient Visit on 09/16/2022   Component Date Value   • EXT Preg Test, Ur 09/16/2022 Negative    • Control 09/16/2022 Valid    • POC Glucose 09/16/2022 133          Radiology Results:   No results found

## 2022-10-28 ENCOUNTER — TRANSCRIBE ORDERS (OUTPATIENT)
Dept: GASTROENTEROLOGY | Facility: CLINIC | Age: 37
End: 2022-10-28

## 2022-11-21 DIAGNOSIS — K86.1 OTHER CHRONIC PANCREATITIS (HCC): ICD-10-CM

## 2022-11-21 DIAGNOSIS — R10.13 EPIGASTRIC PAIN: ICD-10-CM

## 2022-11-21 DIAGNOSIS — K85.90 RECURRENT PANCREATITIS: ICD-10-CM

## 2022-11-21 DIAGNOSIS — R11.0 NAUSEA: ICD-10-CM

## 2022-11-21 DIAGNOSIS — K80.20 GALLSTONES: ICD-10-CM

## 2022-11-21 RX ORDER — PANTOPRAZOLE SODIUM 40 MG/1
TABLET, DELAYED RELEASE ORAL
Qty: 180 TABLET | Refills: 2 | Status: SHIPPED | OUTPATIENT
Start: 2022-11-21

## 2022-11-23 ENCOUNTER — HOSPITAL ENCOUNTER (OUTPATIENT)
Dept: RADIOLOGY | Facility: HOSPITAL | Age: 37
Discharge: HOME | End: 2022-11-23
Attending: INTERNAL MEDICINE

## 2022-11-23 DIAGNOSIS — R68.81 EARLY SATIETY: ICD-10-CM

## 2022-11-23 DIAGNOSIS — R10.13 EPIGASTRIC PAIN: ICD-10-CM

## 2022-11-23 DIAGNOSIS — R11.0 NAUSEA: ICD-10-CM

## 2022-12-23 DIAGNOSIS — K59.09 CHRONIC CONSTIPATION: Primary | ICD-10-CM

## 2022-12-23 DIAGNOSIS — R10.13 EPIGASTRIC PAIN: ICD-10-CM

## 2022-12-23 RX ORDER — DOCUSATE SODIUM 100 MG/1
100 CAPSULE, LIQUID FILLED ORAL 2 TIMES DAILY
Qty: 60 CAPSULE | Refills: 5 | Status: SHIPPED | OUTPATIENT
Start: 2022-12-23 | End: 2023-06-21

## 2022-12-23 RX ORDER — DICYCLOMINE HYDROCHLORIDE 10 MG/1
10 CAPSULE ORAL 4 TIMES DAILY PRN
Qty: 60 CAPSULE | Refills: 5 | Status: SHIPPED | OUTPATIENT
Start: 2022-12-23 | End: 2022-12-30

## 2022-12-27 ENCOUNTER — TELEPHONE (OUTPATIENT)
Dept: GASTROENTEROLOGY | Facility: CLINIC | Age: 37
End: 2022-12-27

## 2022-12-27 NOTE — TELEPHONE ENCOUNTER
----- Message from Ryanne Jaeger MD sent at 12/27/2022  1:54 PM EST -----  Regarding: FW: Pancreas   Contact: [phone]  Dear staff: Please schedule office visit for the patient with me within 1 to 2 weeks    ----- Message -----  From: Oneida Lemus MA  Sent: 12/27/2022   7:18 AM EST  To: Ryanne Jaeger MD  Subject: FW: Pancreas                                       ----- Message -----  From: Koby Grajeda  Sent: 12/26/2022   3:25 PM EST  To: , #  Subject: Pancreas                                         I tried all that and the meds are not helping the only thing the muscle relaxer did was give me diarrhea I cant keep going w the pain I'm in on a daily basis is there anything else for pain I take Tylenol and ibuprofen round the clock and it doesn't work

## 2022-12-30 DIAGNOSIS — R10.13 EPIGASTRIC PAIN: ICD-10-CM

## 2022-12-30 DIAGNOSIS — K59.09 CHRONIC CONSTIPATION: ICD-10-CM

## 2022-12-30 RX ORDER — DICYCLOMINE HYDROCHLORIDE 10 MG/1
CAPSULE ORAL
Qty: 360 CAPSULE | Refills: 1 | Status: SHIPPED | OUTPATIENT
Start: 2022-12-30

## 2023-03-27 DIAGNOSIS — IMO0002 RECURRENT PANCREATITIS: ICD-10-CM

## 2023-03-27 DIAGNOSIS — K86.1 OTHER CHRONIC PANCREATITIS (HCC): ICD-10-CM

## 2023-03-28 RX ORDER — PANCRELIPASE 24000; 76000; 120000 [USP'U]/1; [USP'U]/1; [USP'U]/1
CAPSULE, DELAYED RELEASE PELLETS ORAL
Qty: 90 CAPSULE | Refills: 3 | Status: SHIPPED | OUTPATIENT
Start: 2023-03-28

## 2023-07-31 DIAGNOSIS — K86.1 OTHER CHRONIC PANCREATITIS (HCC): ICD-10-CM

## 2023-07-31 RX ORDER — PANCRELIPASE 24000; 76000; 120000 [USP'U]/1; [USP'U]/1; [USP'U]/1
CAPSULE, DELAYED RELEASE PELLETS ORAL
Qty: 90 CAPSULE | Refills: 3 | Status: SHIPPED | OUTPATIENT
Start: 2023-07-31

## 2024-08-30 NOTE — TELEPHONE ENCOUNTER
Spoke to Patient  She will be calling doctor doing the surgery to get a letter for work  Unavailable

## 2024-10-25 ENCOUNTER — HOSPITAL ENCOUNTER (EMERGENCY)
Facility: HOSPITAL | Age: 39
Discharge: HOME | End: 2024-10-25
Attending: EMERGENCY MEDICINE
Payer: COMMERCIAL

## 2024-10-25 VITALS
TEMPERATURE: 98.1 F | DIASTOLIC BLOOD PRESSURE: 70 MMHG | RESPIRATION RATE: 18 BRPM | BODY MASS INDEX: 29.16 KG/M2 | HEART RATE: 81 BPM | WEIGHT: 154.32 LBS | OXYGEN SATURATION: 99 % | SYSTOLIC BLOOD PRESSURE: 114 MMHG

## 2024-10-25 DIAGNOSIS — R10.9 ABDOMINAL PAIN: Primary | ICD-10-CM

## 2024-10-25 LAB
ALBUMIN SERPL BCG-MCNC: 3.8 G/DL (ref 3.5–5)
ALP SERPL-CCNC: 126 U/L (ref 34–104)
ALT SERPL W P-5'-P-CCNC: 14 U/L (ref 7–52)
ANION GAP SERPL CALCULATED.3IONS-SCNC: 7 MMOL/L (ref 4–13)
AST SERPL W P-5'-P-CCNC: 11 U/L (ref 13–39)
ATRIAL RATE: 80 BPM
BASOPHILS # BLD AUTO: 0.1 THOUSANDS/ΜL (ref 0–0.1)
BASOPHILS NFR BLD AUTO: 1 % (ref 0–1)
BILIRUB SERPL-MCNC: 0.43 MG/DL (ref 0.2–1)
BUN SERPL-MCNC: 6 MG/DL (ref 5–25)
CALCIUM SERPL-MCNC: 8.5 MG/DL (ref 8.4–10.2)
CHLORIDE SERPL-SCNC: 104 MMOL/L (ref 96–108)
CO2 SERPL-SCNC: 25 MMOL/L (ref 21–32)
CREAT SERPL-MCNC: 0.44 MG/DL (ref 0.6–1.3)
EOSINOPHIL # BLD AUTO: 0.47 THOUSAND/ΜL (ref 0–0.61)
EOSINOPHIL NFR BLD AUTO: 4 % (ref 0–6)
ERYTHROCYTE [DISTWIDTH] IN BLOOD BY AUTOMATED COUNT: 14.6 % (ref 11.6–15.1)
GFR SERPL CREATININE-BSD FRML MDRD: 128 ML/MIN/1.73SQ M
GLUCOSE SERPL-MCNC: 107 MG/DL (ref 65–140)
GLUCOSE SERPL-MCNC: 134 MG/DL (ref 65–140)
HCT VFR BLD AUTO: 43.1 % (ref 34.8–46.1)
HGB BLD-MCNC: 14.6 G/DL (ref 11.5–15.4)
IMM GRANULOCYTES # BLD AUTO: 0.04 THOUSAND/UL (ref 0–0.2)
IMM GRANULOCYTES NFR BLD AUTO: 0 % (ref 0–2)
LIPASE SERPL-CCNC: 16 U/L (ref 11–82)
LYMPHOCYTES # BLD AUTO: 4.57 THOUSANDS/ΜL (ref 0.6–4.47)
LYMPHOCYTES NFR BLD AUTO: 39 % (ref 14–44)
MCH RBC QN AUTO: 31.1 PG (ref 26.8–34.3)
MCHC RBC AUTO-ENTMCNC: 33.9 G/DL (ref 31.4–37.4)
MCV RBC AUTO: 92 FL (ref 82–98)
MONOCYTES # BLD AUTO: 1.02 THOUSAND/ΜL (ref 0.17–1.22)
MONOCYTES NFR BLD AUTO: 9 % (ref 4–12)
NEUTROPHILS # BLD AUTO: 5.49 THOUSANDS/ΜL (ref 1.85–7.62)
NEUTS SEG NFR BLD AUTO: 47 % (ref 43–75)
NRBC BLD AUTO-RTO: 0 /100 WBCS
P AXIS: 60 DEGREES
PLATELET # BLD AUTO: 495 THOUSANDS/UL (ref 149–390)
PMV BLD AUTO: 8.9 FL (ref 8.9–12.7)
POTASSIUM SERPL-SCNC: 3.3 MMOL/L (ref 3.5–5.3)
PR INTERVAL: 132 MS
PROT SERPL-MCNC: 6.3 G/DL (ref 6.4–8.4)
QRS AXIS: 71 DEGREES
QRSD INTERVAL: 100 MS
QT INTERVAL: 386 MS
QTC INTERVAL: 445 MS
RBC # BLD AUTO: 4.69 MILLION/UL (ref 3.81–5.12)
SODIUM SERPL-SCNC: 136 MMOL/L (ref 135–147)
T WAVE AXIS: 45 DEGREES
VENTRICULAR RATE: 80 BPM
WBC # BLD AUTO: 11.69 THOUSAND/UL (ref 4.31–10.16)

## 2024-10-25 PROCEDURE — 80053 COMPREHEN METABOLIC PANEL: CPT

## 2024-10-25 PROCEDURE — 93010 ELECTROCARDIOGRAM REPORT: CPT | Performed by: INTERNAL MEDICINE

## 2024-10-25 PROCEDURE — 99285 EMERGENCY DEPT VISIT HI MDM: CPT | Performed by: EMERGENCY MEDICINE

## 2024-10-25 PROCEDURE — 36415 COLL VENOUS BLD VENIPUNCTURE: CPT

## 2024-10-25 PROCEDURE — 96375 TX/PRO/DX INJ NEW DRUG ADDON: CPT

## 2024-10-25 PROCEDURE — 96374 THER/PROPH/DIAG INJ IV PUSH: CPT

## 2024-10-25 PROCEDURE — 82948 REAGENT STRIP/BLOOD GLUCOSE: CPT

## 2024-10-25 PROCEDURE — 93005 ELECTROCARDIOGRAM TRACING: CPT

## 2024-10-25 PROCEDURE — 85025 COMPLETE CBC W/AUTO DIFF WBC: CPT

## 2024-10-25 PROCEDURE — 99283 EMERGENCY DEPT VISIT LOW MDM: CPT

## 2024-10-25 PROCEDURE — 83690 ASSAY OF LIPASE: CPT

## 2024-10-25 PROCEDURE — 96361 HYDRATE IV INFUSION ADD-ON: CPT

## 2024-10-25 RX ORDER — HYDROMORPHONE HCL/PF 1 MG/ML
0.5 SYRINGE (ML) INJECTION ONCE
Status: COMPLETED | OUTPATIENT
Start: 2024-10-25 | End: 2024-10-25

## 2024-10-25 RX ORDER — KETAMINE HCL IN NACL, ISO-OSM 100MG/10ML
0.3 SYRINGE (ML) INJECTION ONCE
Status: COMPLETED | OUTPATIENT
Start: 2024-10-25 | End: 2024-10-25

## 2024-10-25 RX ORDER — DROPERIDOL 2.5 MG/ML
0.62 INJECTION, SOLUTION INTRAMUSCULAR; INTRAVENOUS ONCE
Status: COMPLETED | OUTPATIENT
Start: 2024-10-25 | End: 2024-10-25

## 2024-10-25 RX ORDER — KETAMINE HCL IN NACL, ISO-OSM 100MG/10ML
0.3 SYRINGE (ML) INJECTION ONCE
Status: DISCONTINUED | OUTPATIENT
Start: 2024-10-25 | End: 2024-10-25

## 2024-10-25 RX ADMIN — SODIUM CHLORIDE 1000 ML: 0.9 INJECTION, SOLUTION INTRAVENOUS at 02:25

## 2024-10-25 RX ADMIN — DROPERIDOL 0.62 MG: 2.5 INJECTION, SOLUTION INTRAMUSCULAR; INTRAVENOUS at 02:29

## 2024-10-25 RX ADMIN — Medication 21 MG: at 03:54

## 2024-10-25 RX ADMIN — HYDROMORPHONE HYDROCHLORIDE 0.5 MG: 1 INJECTION, SOLUTION INTRAMUSCULAR; INTRAVENOUS; SUBCUTANEOUS at 02:29

## 2024-10-25 NOTE — DISCHARGE INSTRUCTIONS
Your blood work did not show anything concerning.  I would recommend that you follow-up with your pain management doctor as well as your other doctors as scheduled.  You should return to the emergency room if you have persistent nausea and vomiting and are unable to eat or drink, if you develop fevers, or if you have severe pain that is unresponsive to home medication.

## 2024-10-25 NOTE — ED PROVIDER NOTES
Time reflects when diagnosis was documented in both MDM as applicable and the Disposition within this note       Time User Action Codes Description Comment    10/25/2024  4:32 AM Bob Murillo Add [R10.9] Abdominal pain           ED Disposition       ED Disposition   Discharge    Condition   Stable    Date/Time   Fri Oct 25, 2024  4:32 AM    Comment   Diane Hays discharge to home/self care.                   Assessment & Plan       Medical Decision Making  30-year-old female with history of chronic pancreatitis, diabetes with intractable epigastric pain and nausea. Patient with normal VS on presentation. Appears well and NAD. HEENT exam unremarkable with moist mucous membranes. Lungs CTA with good air movement. Heart sounds normal with RRR. Abdominal exam with epigastric tenderness but otherwise soft and nondistended and no rebound or guarding. Normal cap refill and equal pulses. No LE edema.  Concern for acute on chronic pancreatitis, gastritis, GERD, dehydration.  I will get abdominal labs and treat symptomatically and give fluids.    Amount and/or Complexity of Data Reviewed  Labs: ordered. Decision-making details documented in ED Course.    Risk  Prescription drug management.        ED Course as of 10/25/24 0437   Fri Oct 25, 2024   0315 CBC and differential(!)  Mild leukocytosis with normal differential.  No anemia.  Mild thrombocytosis.   0315 Comprehensive metabolic panel(!)  Mild hypokalemia with otherwise normal electrolytes and kidney function.  Stable LFT findings.   0316 LIPASE: 16   0433 Reassessed patient and discussed reassuring workup.  She states her pain is much better and she is feeling well enough to go home.  She does tell me that she has a pain management doctor.  I recommended that she follow-up with them as well as her other doctors as scheduled. Patient voiced understanding of the plan and all questions were answered. Strict return precautions given. Patient is hemodynamically  stable and safe for discharge at this time.       Medications   droperidol (INAPSINE) injection 0.625 mg (0.625 mg Intravenous Given 10/25/24 0229)   sodium chloride 0.9 % bolus 1,000 mL (1,000 mL Intravenous New Bag 10/25/24 0225)   HYDROmorphone (DILAUDID) injection 0.5 mg (0.5 mg Intravenous Given 10/25/24 0229)   Ketamine HCl 21 mg in sodium chloride 0.9 % 50 mL (21 mg Intravenous Given 10/25/24 0354)       ED Risk Strat Scores                                               History of Present Illness       Chief Complaint   Patient presents with    Nausea     Per ems - patient was admitted at [facility].  Patient walked up street to [facility] and felt like sugar was low.  Patient's bg was 78 for EMS.  Patient c/o nausea, states she recently had ERCP       Past Medical History:   Diagnosis Date    Alopecia     Bell's palsy     Cholecystitis     Chronic pancreatitis (HCC)     Diabetes mellitus (HCC)     High cholesterol     Hypertension       Past Surgical History:   Procedure Laterality Date    CARPAL TUNNEL RELEASE       SECTION      UPPER GASTROINTESTINAL ENDOSCOPY      US GUIDED THYROID BIOPSY  2024      History reviewed. No pertinent family history.   Social History     Tobacco Use    Smoking status: Every Day     Current packs/day: 0.50     Types: Cigarettes    Smokeless tobacco: Never   Vaping Use    Vaping status: Never Used   Substance Use Topics    Alcohol use: Yes     Comment: SOCIAL    Drug use: Never      E-Cigarette/Vaping    E-Cigarette Use Never User       E-Cigarette/Vaping Substances      I have reviewed and agree with the history as documented.     HPI  38-year-old female with history of chronic pancreatitis, diabetes, pancreatic devisum presents to the ED for evaluation of severe epigastric pain radiating to the back with associated nausea and decreased p.o. intake.  She states that she has had surgery in the past for her chronic pancreatitis and had an ERCP  yesterday and had increasing pain after the procedure so she was admitted to [facility].  Per patient, she was not attended to on the floor for several hours and decided to leave.  She comes back because she continues to have worsening pain and is unable to eat or drink and is worried that her sugar will drop.  Denies fevers, chest pain, shortness of breath, diarrhea, decreased urine output.  Review of Systems  See HPI      Objective       ED Triage Vitals   Temperature Pulse Blood Pressure Respirations SpO2 Patient Position - Orthostatic VS   10/25/24 0205 10/25/24 0204 10/25/24 0204 10/25/24 0204 10/25/24 0204 --   98.1 °F (36.7 °C) 94 (!) 152/101 20 97 %       Temp Source Heart Rate Source BP Location FiO2 (%) Pain Score    10/25/24 0205 10/25/24 0204 -- -- 10/25/24 0206    Oral Monitor   10 - Worst Possible Pain      Vitals      Date and Time Temp Pulse SpO2 Resp BP Pain Score FACES Pain Rating User   10/25/24 0435 -- 81 99 % 18 114/70 8 -- KG   10/25/24 0420 -- 69 97 % 18 96/57 9 -- KG   10/25/24 0405 -- 67 97 % 18 100/58 10 - Worst Possible Pain -- KG   10/25/24 0350 -- 71 98 % 18 114/66 10 - Worst Possible Pain -- KG   10/25/24 0315 -- 73 98 % 18 117/67 10 - Worst Possible Pain -- KG   10/25/24 0229 -- -- -- -- -- 10 - Worst Possible Pain -- KG   10/25/24 0206 -- -- -- -- -- 10 - Worst Possible Pain -- KG   10/25/24 0205 98.1 °F (36.7 °C) -- -- -- -- -- -- KG   10/25/24 0204 -- 94 97 % 20 152/101 -- -- KG            Physical Exam  Constitutional:       Appearance: Normal appearance.   HENT:      Head: Normocephalic and atraumatic.      Mouth/Throat:      Mouth: Mucous membranes are moist.      Pharynx: Oropharynx is clear.   Eyes:      Extraocular Movements: Extraocular movements intact.      Conjunctiva/sclera: Conjunctivae normal.   Cardiovascular:      Rate and Rhythm: Normal rate and regular rhythm.      Pulses: Normal pulses.      Heart sounds: Normal heart sounds.   Pulmonary:      Effort:  Pulmonary effort is normal.      Breath sounds: Normal breath sounds.   Abdominal:      General: There is no distension.      Palpations: Abdomen is soft.      Tenderness: There is abdominal tenderness (Epigastrium). There is no guarding or rebound.   Musculoskeletal:      Cervical back: Normal range of motion and neck supple.      Right lower leg: No edema.      Left lower leg: No edema.   Skin:     General: Skin is warm and dry.      Capillary Refill: Capillary refill takes less than 2 seconds.   Neurological:      General: No focal deficit present.      Mental Status: She is alert and oriented to person, place, and time.         Results Reviewed       Procedure Component Value Units Date/Time    Comprehensive metabolic panel [[id number]]  (Abnormal) Collected: 10/25/24 0231    Lab Status: Final result Specimen: Blood from Arm, Right Updated: 10/25/24 0301     Sodium 136 mmol/L      Potassium 3.3 mmol/L      Chloride 104 mmol/L      CO2 25 mmol/L      ANION GAP 7 mmol/L      BUN 6 mg/dL      Creatinine 0.44 mg/dL      Glucose 134 mg/dL      Calcium 8.5 mg/dL      AST 11 U/L      ALT 14 U/L      Alkaline Phosphatase 126 U/L      Total Protein 6.3 g/dL      Albumin 3.8 g/dL      Total Bilirubin 0.43 mg/dL      eGFR 128 ml/min/1.73sq m     Narrative:      [employer] guidelines for Chronic Kidney Disease (CKD):     Stage 1 with normal or high GFR (GFR > 90 mL/min/1.73 square meters)    Stage 2 Mild CKD (GFR = 60-89 mL/min/1.73 square meters)    Stage 3A Moderate CKD (GFR = 45-59 mL/min/1.73 square meters)    Stage 3B Moderate CKD (GFR = 30-44 mL/min/1.73 square meters)    Stage 4 Severe CKD (GFR = 15-29 mL/min/1.73 square meters)    Stage 5 End Stage CKD (GFR <15 mL/min/1.73 square meters)  Note: GFR calculation is accurate only with a steady state creatinine    Lipase [[id number]]  (Normal) Collected: 10/25/24 0231    Lab Status: Final result Specimen: Blood from Arm, Right Updated: 10/25/24  0301     Lipase 16 u/L     CBC and differential [[id number]]  (Abnormal) Collected: 10/25/24 0231    Lab Status: Final result Specimen: Blood from Arm, Right Updated: 10/25/24 0241     WBC 11.69 Thousand/uL      RBC 4.69 Million/uL      Hemoglobin 14.6 g/dL      Hematocrit 43.1 %      MCV 92 fL      MCH 31.1 pg      MCHC 33.9 g/dL      RDW 14.6 %      MPV 8.9 fL      Platelets 495 Thousands/uL      nRBC 0 /100 WBCs      Segmented % 47 %      Immature Grans % 0 %      Lymphocytes % 39 %      Monocytes % 9 %      Eosinophils Relative 4 %      Basophils Relative 1 %      Absolute Neutrophils 5.49 Thousands/µL      Absolute Immature Grans 0.04 Thousand/uL      Absolute Lymphocytes 4.57 Thousands/µL      Absolute Monocytes 1.02 Thousand/µL      Eosinophils Absolute 0.47 Thousand/µL      Basophils Absolute 0.10 Thousands/µL     Fingerstick Glucose (POCT) [[id number]]  (Normal) Collected: 10/25/24 020    Lab Status: Final result Specimen: Blood Updated: 10/25/24 0208     POC Glucose 107 mg/dl             No orders to display       Procedures    ED Medication and Procedure Management   Prior to Admission Medications   Prescriptions Last Dose Informant Patient Reported? Taking?   B-D UF III MINI PEN NEEDLES 31G X 5 MM MISC  Self Yes No   SiXDAY   Continuous Blood Gluc Sensor (FreeStyle Maxwell 14 Day Sensor) MISC  Self Yes No   Sig: INJECT 1 EACH UNDER THE SKIN EVERY 14 (FOURTEEN) DAYS.   Creon [id number] units   No No   Sig: TAKE 24,000 UNITS OF LIPASE BY MOUTH 3 (THREE) TIMES A DAY WITH MEALS   Diclofenac Sodium (VOLTAREN) 1 %  Self Yes No   Sig: Apply 2 g topically 4 (four) times a day   Levonorgestrel 20.1 MCG/DAY IUD  Self Yes No   Si each by Intrauterine route   Omega-3 Fatty Acids (fish oil) 1,000 mg  Self Yes No   Sig: Take 1 g by mouth 2 (two) times a day   atorvastatin (LIPITOR) 40 mg tablet  Self Yes No   Sig: Take 40 mg by mouth daily with dinner   bisacodyl (FLEET) 10 MG/30ML ENEM   No No   Sig: Insert 30  mL (10 mg total) into the rectum once for 1 dose   dicyclomine (BENTYL) 10 mg capsule   No No   Sig: TAKE 1 CAPSULE (10 MG TOTAL) BY MOUTH 4 TIMES A DAY AS NEEDED FOR ABDOMINAL PAIN   docusate sodium (COLACE) 100 mg capsule   No No   Sig: Take 1 capsule (100 mg total) by mouth 2 (two) times a day   insulin glargine (LANTUS) 100 units/mL subcutaneous injection  Self Yes No   Sig: Inject 38 Units under the skin every 12 (twelve) hours   insulin lispro (HumaLOG) 100 units/mL injection pen  Self Yes No   Si units three times a day  ISF 20 Target 120   lisinopril (ZESTRIL) 10 mg tablet  Self Yes No   Sig: Take 10 mg by mouth daily   metFORMIN (GLUCOPHAGE) 1000 MG tablet  Self Yes No   Sig: Take 1,000 mg by mouth 2 (two) times a day with meals   metFORMIN (GLUCOPHAGE-XR) 500 mg 24 hr tablet   Yes No   Sig: Take 1,000 mg by mouth 2 (two) times a day with meals   ondansetron (Zofran ODT) 4 mg disintegrating tablet  Self No No   Sig: Take 1 tablet (4 mg total) by mouth every 6 (six) hours as needed for nausea or vomiting   pantoprazole (PROTONIX) 40 mg tablet   No No   Sig: TAKE 1 TABLET BY MOUTH 2 TIMES A DAY BEFORE MEALS.   polyethylene glycol (MIRALAX) 17 g packet   No No   Sig: Take 17 g by mouth daily   simvastatin (ZOCOR) 20 mg tablet  Self Yes No   Sig: Take 20 mg by mouth daily at bedtime   venlafaxine (EFFEXOR-XR) 150 mg 24 hr capsule  Self Yes No   Sig: Take 150 mg by mouth daily      Facility-Administered Medications: None     Patient's Medications   Discharge Prescriptions    No medications on file     No discharge procedures on file.  ED SEPSIS DOCUMENTATION   Time reflects when diagnosis was documented in both MDM as applicable and the Disposition within this note       Time User Action Codes Description Comment    10/25/2024  4:32 AM Bob Murillo Add [R10.9] Abdominal pain                  Bob Murillo,   10/25/24 0437

## 2024-10-25 NOTE — ED ATTENDING ATTESTATION
10/25/2024  IChato MD, saw and evaluated the patient. I have discussed the patient with the resident/non-physician practitioner and agree with the resident's/non-physician practitioner's findings, Plan of Care, and MDM as documented in the resident's/non-physician practitioner's note, except where noted. All available labs and Radiology studies were reviewed.  I was present for key portions of any procedure(s) performed by the resident/non-physician practitioner and I was immediately available to provide assistance.       At this point I agree with the current assessment done in the Emergency Department.  I have conducted an independent evaluation of this patient a history and physical is as follows:    Final Diagnosis:  1. Abdominal pain      Chief Complaint   Patient presents with    Nausea     Per ems - patient was admitted at [facility].  Patient walked up street to [facility] and felt like sugar was low.  Patient's bg was 78 for EMS.  Patient c/o nausea, states she recently had ERCP           A:  -38-year-old female who presents with chronic abdominal pain.      P:  - given the presentation, will check CBC for marked leukocytosis  - CMP for liver enzyme elevation that could signal cholecystitis, biliary obstructive disease. Check RFTs for JEAN / markers of dehydration.  - Lipase given abdominal pain to evaluate specifically for pancreatitis.  -Will hold off on imaging as she had an unremarkable CT abdomen/pelvis less than 24 hours ago.  -IV analgesia.  Will limit IV narcotics to 1 dose.  Give IV droperidol.  Reassess.  - Disposition per workup.        H:    38-year-old female who presents with epigastric abdominal pain.  Has a history of chronic pancreatitis status post distal pancreatectomy and splenectomy with celiac plexus nerve block, cholecystectomy.  Had an ERCP performed at [facility] on 10/23/2024 which was unremarkable.  Presented to [facility] yesterday for severe epigastric  "abdominal pain.  Had unremarkable lab work and CT abdomen/pelvis.  Admitted to the hospital for intractable pain.    Nursing progress note:    \"Admitted patient approximately 2300. Patient complained of excruciating abdominal pain of 10 out of 10. I text on call doctor for pain medications. Morphine 4 mg was ordered. When I went to administered the medication pt stated she does not want it because it did not help when she got it in the ED. I encouraged to take the medication until she was seen by the MD. She agreed and I left to attend to another patient. Shortly afterwards supervisor called me to inform me that patient called the ED requesting for the doctor to come and see her. Supervisor instruct me to go and tell her that she cannot call the ED. When I went into her room, she was pacing and cursing that she had been ignored for 3 hours. She then stated that her blood sugar is 75. I offered to give her some juice but she said she cannot drink anything. I told her I am going to contact the oncall to get an order for IV fluids and I am going to take her blood sugar. Pt had a blood sugar tracker jose that she check her BS with. I ask Sylwia RN to check her blood sugar for me, while I contact the MD. When The nurse went to check her Blood sugar she was gone. She had her IV in. All this happened in the about 3 minutes. I called patient in the presence of supervisor to find out why she left, but she started screaming at me saying I ignored her for 3 hours and I did not addresses her blood sugar dropping. She threatened me that when she is done with me I wont have a job anymore or be a nurse. I could not get through to her so I gave the phone to the supervisor. The supervisor was not effective in getting her to comeback to take the IV out.\"      PMH:  Past Medical History:   Diagnosis Date    Alopecia     Bell's palsy     Cholecystitis     Chronic pancreatitis (HCC)     Diabetes mellitus (HCC)     High cholesterol     " Hypertension        PSH:  Past Surgical History:   Procedure Laterality Date    CARPAL TUNNEL RELEASE       SECTION  2006    UPPER GASTROINTESTINAL ENDOSCOPY      US GUIDED THYROID BIOPSY  2024         PE:   Vitals:    10/25/24 0315 10/25/24 0350 10/25/24 0405 10/25/24 0420   BP: 117/67 114/66 100/58 96/57   Pulse: 73 71 67 69   Resp: 18 18 18 18   Temp:       TempSrc:       SpO2: 98% 98% 97% 97%   Weight:             Constitutional: Vital signs are normal. She appears well-developed. She is cooperative. No distress.   HENT:   Mouth/Throat: Uvula is midline, oropharynx is clear and moist and mucous membranes are normal.   Eyes: Pupils are equal, round, and reactive to light. Conjunctivae and EOM are normal.   Neck: Trachea normal. No thyroid mass and no thyromegaly present.   Cardiovascular: Normal rate, regular rhythm, normal heart sounds.   No murmur heard.  Pulmonary/Chest: Effort normal and breath sounds normal.   Abdominal: Soft. Normal appearance and bowel sounds are normal. There is epigastric abdominal tenderness. There is no rebound, no guarding.   Neurological: She is alert.   Skin: Skin is warm, dry and intact.   Psychiatric: She has a normal mood and affect. Her speech is normal and behavior is normal. Thought content normal.          - 13 point ROS was performed and all are normal unless stated in the history above.   - Nursing note reviewed. Vitals reviewed.   - Orders placed by myself and/or advanced practitioner / resident.    - Previous chart was reviewed  - No language barrier.   - History obtained from patient.   - There are no limitations to the history obtained. Reasons ROS could not be obtained:  N/A         Medications   droperidol (INAPSINE) injection 0.625 mg (0.625 mg Intravenous Given 10/25/24 0229)   sodium chloride 0.9 % bolus 1,000 mL (1,000 mL Intravenous New Bag 10/25/24 0225)   HYDROmorphone (DILAUDID) injection 0.5 mg (0.5 mg Intravenous Given 10/25/24 0229)   Ketamine  HCl 21 mg in sodium chloride 0.9 % 50 mL (21 mg Intravenous Given 10/25/24 0354)     No orders to display     Orders Placed This Encounter   Procedures    CBC and differential    Comprehensive metabolic panel    Lipase    Notify prescriber instructions    Nursing communication Patient monitoring - baseline vitals including HR, RR, Pulse ox, LOC and pain score.    Nursing communication Repeat vital signs, oxygen saturation, LOC, and pain score every 15 minutes x 4 and then every 30 minutes x 2. After this period, vital signs should be rechecked per unit routine.    ECG 12 lead     Labs Reviewed   CBC AND DIFFERENTIAL - Abnormal       Result Value Ref Range Status    WBC 11.69 (*) 4.31 - 10.16 Thousand/uL Final    RBC 4.69  3.81 - 5.12 Million/uL Final    Hemoglobin 14.6  11.5 - 15.4 g/dL Final    Hematocrit 43.1  34.8 - 46.1 % Final    MCV 92  82 - 98 fL Final    MCH 31.1  26.8 - 34.3 pg Final    MCHC 33.9  31.4 - 37.4 g/dL Final    RDW 14.6  11.6 - 15.1 % Final    MPV 8.9  8.9 - 12.7 fL Final    Platelets 495 (*) 149 - 390 Thousands/uL Final    nRBC 0  /100 WBCs Final    Segmented % 47  43 - 75 % Final    Immature Grans % 0  0 - 2 % Final    Lymphocytes % 39  14 - 44 % Final    Monocytes % 9  4 - 12 % Final    Eosinophils Relative 4  0 - 6 % Final    Basophils Relative 1  0 - 1 % Final    Absolute Neutrophils 5.49  1.85 - 7.62 Thousands/µL Final    Absolute Immature Grans 0.04  0.00 - 0.20 Thousand/uL Final    Absolute Lymphocytes 4.57 (*) 0.60 - 4.47 Thousands/µL Final    Absolute Monocytes 1.02  0.17 - 1.22 Thousand/µL Final    Eosinophils Absolute 0.47  0.00 - 0.61 Thousand/µL Final    Basophils Absolute 0.10  0.00 - 0.10 Thousands/µL Final   COMPREHENSIVE METABOLIC PANEL - Abnormal    Sodium 136  135 - 147 mmol/L Final    Potassium 3.3 (*) 3.5 - 5.3 mmol/L Final    Chloride 104  96 - 108 mmol/L Final    CO2 25  21 - 32 mmol/L Final    ANION GAP 7  4 - 13 mmol/L Final    BUN 6  5 - 25 mg/dL Final    Creatinine  0.44 (*) 0.60 - 1.30 mg/dL Final    Comment: Standardized to IDMS reference method    Glucose 134  65 - 140 mg/dL Final    Comment: If the patient is fasting, the ADA then defines impaired fasting glucose as > 100 mg/dL and diabetes as > or equal to 123 mg/dL.    Calcium 8.5  8.4 - 10.2 mg/dL Final    AST 11 (*) 13 - 39 U/L Final    ALT 14  7 - 52 U/L Final    Comment: Specimen collection should occur prior to Sulfasalazine administration due to the potential for falsely depressed results.     Alkaline Phosphatase 126 (*) 34 - 104 U/L Final    Total Protein 6.3 (*) 6.4 - 8.4 g/dL Final    Albumin 3.8  3.5 - 5.0 g/dL Final    Total Bilirubin 0.43  0.20 - 1.00 mg/dL Final    Comment: Use of this assay is not recommended for patients undergoing treatment with eltrombopag due to the potential for falsely elevated results.  N-acetyl-p-benzoquinone imine (metabolite of Acetaminophen) will generate erroneously low results in samples for patients that have taken an overdose of Acetaminophen.    eGFR 128  ml/min/1.73sq m Final    Narrative:     National Kidney Disease Foundation guidelines for Chronic Kidney Disease (CKD):     Stage 1 with normal or high GFR (GFR > 90 mL/min/1.73 square meters)    Stage 2 Mild CKD (GFR = 60-89 mL/min/1.73 square meters)    Stage 3A Moderate CKD (GFR = 45-59 mL/min/1.73 square meters)    Stage 3B Moderate CKD (GFR = 30-44 mL/min/1.73 square meters)    Stage 4 Severe CKD (GFR = 15-29 mL/min/1.73 square meters)    Stage 5 End Stage CKD (GFR <15 mL/min/1.73 square meters)  Note: GFR calculation is accurate only with a steady state creatinine   LIPASE - Normal    Lipase 16  11 - 82 u/L Final   POCT GLUCOSE - Normal    POC Glucose 107  65 - 140 mg/dl Final     Time reflects when diagnosis was documented in both MDM as applicable and the Disposition within this note       Time User Action Codes Description Comment    10/25/2024  4:32 AM Bob Murillo Add [R10.9] Abdominal pain           ED  Disposition       ED Disposition   Discharge    Condition   Stable    Date/Time   Fri Oct 25, 2024  4:32 AM    Comment   Diane Saúl discharge to home/self care.                   Follow-up Information    None       Patient's Medications   Discharge Prescriptions    No medications on file     No discharge procedures on file.  Prior to Admission Medications   Prescriptions Last Dose Informant Patient Reported? Taking?   B-D UF III MINI PEN NEEDLES 31G X 5 MM MISC  Self Yes No   SiXDAY   Continuous Blood Gluc Sensor (FreeStyle Maxwell 14 Day Sensor) MISC  Self Yes No   Sig: INJECT 1 EACH UNDER THE SKIN EVERY 14 (FOURTEEN) DAYS.   Creon [address]-23122 units   No No   Sig: TAKE 24,000 UNITS OF LIPASE BY MOUTH 3 (THREE) TIMES A DAY WITH MEALS   Diclofenac Sodium (VOLTAREN) 1 %  Self Yes No   Sig: Apply 2 g topically 4 (four) times a day   Levonorgestrel 20.1 MCG/DAY IUD  Self Yes No   Si each by Intrauterine route   Omega-3 Fatty Acids (fish oil) 1,000 mg  Self Yes No   Sig: Take 1 g by mouth 2 (two) times a day   atorvastatin (LIPITOR) 40 mg tablet  Self Yes No   Sig: Take 40 mg by mouth daily with dinner   bisacodyl (FLEET) 10 MG/30ML ENEM   No No   Sig: Insert 30 mL (10 mg total) into the rectum once for 1 dose   dicyclomine (BENTYL) 10 mg capsule   No No   Sig: TAKE 1 CAPSULE (10 MG TOTAL) BY MOUTH 4 TIMES A DAY AS NEEDED FOR ABDOMINAL PAIN   docusate sodium (COLACE) 100 mg capsule   No No   Sig: Take 1 capsule (100 mg total) by mouth 2 (two) times a day   insulin glargine (LANTUS) 100 units/mL subcutaneous injection  Self Yes No   Sig: Inject 38 Units under the skin every 12 (twelve) hours   insulin lispro (HumaLOG) 100 units/mL injection pen  Self Yes No   Si units three times a day  ISF 20 Target 120   lisinopril (ZESTRIL) 10 mg tablet  Self Yes No   Sig: Take 10 mg by mouth daily   metFORMIN (GLUCOPHAGE) 1000 MG tablet  Self Yes No   Sig: Take 1,000 mg by mouth 2 (two) times a day with meals  "  metFORMIN (GLUCOPHAGE-XR) 500 mg 24 hr tablet   Yes No   Sig: Take 1,000 mg by mouth 2 (two) times a day with meals   ondansetron (Zofran ODT) 4 mg disintegrating tablet  Self No No   Sig: Take 1 tablet (4 mg total) by mouth every 6 (six) hours as needed for nausea or vomiting   pantoprazole (PROTONIX) 40 mg tablet   No No   Sig: TAKE 1 TABLET BY MOUTH 2 TIMES A DAY BEFORE MEALS.   polyethylene glycol (MIRALAX) 17 g packet   No No   Sig: Take 17 g by mouth daily   simvastatin (ZOCOR) 20 mg tablet  Self Yes No   Sig: Take 20 mg by mouth daily at bedtime   venlafaxine (EFFEXOR-XR) 150 mg 24 hr capsule  Self Yes No   Sig: Take 150 mg by mouth daily      Facility-Administered Medications: None       Portions of the record may have been created with voice recognition software. Occasional wrong word or \"sound a like\" substitutions may have occurred due to the inherent limitations of voice recognition software. Read the chart carefully and recognize, using context, where substitutions have occurred.       ED Course         Critical Care Time  Procedures      "

## 2025-07-06 ENCOUNTER — HOSPITAL ENCOUNTER (EMERGENCY)
Facility: HOSPITAL | Age: 40
Discharge: HOME | End: 2025-07-06
Attending: EMERGENCY MEDICINE | Admitting: EMERGENCY MEDICINE
Payer: COMMERCIAL

## 2025-07-06 ENCOUNTER — APPOINTMENT (EMERGENCY)
Dept: CT IMAGING | Facility: HOSPITAL | Age: 40
End: 2025-07-06
Payer: COMMERCIAL

## 2025-07-06 VITALS
BODY MASS INDEX: 29.05 KG/M2 | SYSTOLIC BLOOD PRESSURE: 124 MMHG | DIASTOLIC BLOOD PRESSURE: 66 MMHG | OXYGEN SATURATION: 97 % | HEART RATE: 82 BPM | TEMPERATURE: 97.4 F | HEIGHT: 61 IN | RESPIRATION RATE: 16 BRPM | WEIGHT: 153.88 LBS

## 2025-07-06 DIAGNOSIS — K52.9 ENTERITIS: ICD-10-CM

## 2025-07-06 DIAGNOSIS — R10.30 LOWER ABDOMINAL PAIN: Primary | ICD-10-CM

## 2025-07-06 LAB
ALBUMIN SERPL BCG-MCNC: 4.3 G/DL (ref 3.5–5)
ALP SERPL-CCNC: 109 U/L (ref 34–104)
ALT SERPL W P-5'-P-CCNC: 8 U/L (ref 7–52)
ANION GAP SERPL CALCULATED.3IONS-SCNC: 10 MMOL/L (ref 4–13)
AST SERPL W P-5'-P-CCNC: 12 U/L (ref 13–39)
B-HCG SERPL-ACNC: <0.6 MIU/ML (ref 0–5)
BASOPHILS # BLD AUTO: 0.11 THOUSANDS/ÂΜL (ref 0–0.1)
BASOPHILS NFR BLD AUTO: 1 % (ref 0–1)
BILIRUB SERPL-MCNC: 0.68 MG/DL (ref 0.2–1)
BILIRUB UR QL STRIP: NEGATIVE
BUN SERPL-MCNC: 19 MG/DL (ref 5–25)
CALCIUM SERPL-MCNC: 9.5 MG/DL (ref 8.4–10.2)
CHLORIDE SERPL-SCNC: 101 MMOL/L (ref 96–108)
CLARITY UR: CLEAR
CO2 SERPL-SCNC: 25 MMOL/L (ref 21–32)
COLOR UR: YELLOW
CREAT SERPL-MCNC: 1.08 MG/DL (ref 0.6–1.3)
EOSINOPHIL # BLD AUTO: 0.42 THOUSAND/ÂΜL (ref 0–0.61)
EOSINOPHIL NFR BLD AUTO: 2 % (ref 0–6)
ERYTHROCYTE [DISTWIDTH] IN BLOOD BY AUTOMATED COUNT: 15.9 % (ref 11.6–15.1)
GFR SERPL CREATININE-BSD FRML MDRD: 64 ML/MIN/1.73SQ M
GLUCOSE SERPL-MCNC: 141 MG/DL (ref 65–140)
GLUCOSE UR STRIP-MCNC: NEGATIVE MG/DL
HCT VFR BLD AUTO: 43.1 % (ref 34.8–46.1)
HGB BLD-MCNC: 14.2 G/DL (ref 11.5–15.4)
HGB UR QL STRIP.AUTO: NEGATIVE
IMM GRANULOCYTES # BLD AUTO: 0.12 THOUSAND/UL (ref 0–0.2)
IMM GRANULOCYTES NFR BLD AUTO: 1 % (ref 0–2)
KETONES UR STRIP-MCNC: ABNORMAL MG/DL
LEUKOCYTE ESTERASE UR QL STRIP: NEGATIVE
LIPASE SERPL-CCNC: 7 U/L (ref 11–82)
LYMPHOCYTES # BLD AUTO: 4.19 THOUSANDS/ÂΜL (ref 0.6–4.47)
LYMPHOCYTES NFR BLD AUTO: 19 % (ref 14–44)
MCH RBC QN AUTO: 30.3 PG (ref 26.8–34.3)
MCHC RBC AUTO-ENTMCNC: 32.9 G/DL (ref 31.4–37.4)
MCV RBC AUTO: 92 FL (ref 82–98)
MONOCYTES # BLD AUTO: 0.9 THOUSAND/ÂΜL (ref 0.17–1.22)
MONOCYTES NFR BLD AUTO: 4 % (ref 4–12)
NEUTROPHILS # BLD AUTO: 16.09 THOUSANDS/ÂΜL (ref 1.85–7.62)
NEUTS SEG NFR BLD AUTO: 73 % (ref 43–75)
NITRITE UR QL STRIP: NEGATIVE
NRBC BLD AUTO-RTO: 0 /100 WBCS
PH UR STRIP.AUTO: 6 [PH]
PLATELET # BLD AUTO: 793 THOUSANDS/UL (ref 149–390)
PMV BLD AUTO: 9.5 FL (ref 8.9–12.7)
POTASSIUM SERPL-SCNC: 3.6 MMOL/L (ref 3.5–5.3)
PROT SERPL-MCNC: 7.5 G/DL (ref 6.4–8.4)
PROT UR STRIP-MCNC: NEGATIVE MG/DL
RBC # BLD AUTO: 4.68 MILLION/UL (ref 3.81–5.12)
SODIUM SERPL-SCNC: 136 MMOL/L (ref 135–147)
SP GR UR STRIP.AUTO: >=1.03 (ref 1–1.03)
UROBILINOGEN UR QL STRIP.AUTO: 0.2 E.U./DL
WBC # BLD AUTO: 21.83 THOUSAND/UL (ref 4.31–10.16)

## 2025-07-06 PROCEDURE — 80053 COMPREHEN METABOLIC PANEL: CPT | Performed by: EMERGENCY MEDICINE

## 2025-07-06 PROCEDURE — 96361 HYDRATE IV INFUSION ADD-ON: CPT

## 2025-07-06 PROCEDURE — 85025 COMPLETE CBC W/AUTO DIFF WBC: CPT | Performed by: EMERGENCY MEDICINE

## 2025-07-06 PROCEDURE — 36415 COLL VENOUS BLD VENIPUNCTURE: CPT | Performed by: EMERGENCY MEDICINE

## 2025-07-06 PROCEDURE — 99284 EMERGENCY DEPT VISIT MOD MDM: CPT

## 2025-07-06 PROCEDURE — 74176 CT ABD & PELVIS W/O CONTRAST: CPT

## 2025-07-06 PROCEDURE — 84702 CHORIONIC GONADOTROPIN TEST: CPT | Performed by: EMERGENCY MEDICINE

## 2025-07-06 PROCEDURE — 96375 TX/PRO/DX INJ NEW DRUG ADDON: CPT

## 2025-07-06 PROCEDURE — 96374 THER/PROPH/DIAG INJ IV PUSH: CPT

## 2025-07-06 PROCEDURE — 99285 EMERGENCY DEPT VISIT HI MDM: CPT | Performed by: EMERGENCY MEDICINE

## 2025-07-06 PROCEDURE — 83690 ASSAY OF LIPASE: CPT | Performed by: EMERGENCY MEDICINE

## 2025-07-06 PROCEDURE — 81003 URINALYSIS AUTO W/O SCOPE: CPT | Performed by: EMERGENCY MEDICINE

## 2025-07-06 RX ORDER — DICYCLOMINE HCL 20 MG
20 TABLET ORAL 2 TIMES DAILY
Qty: 20 TABLET | Refills: 0 | Status: SHIPPED | OUTPATIENT
Start: 2025-07-06

## 2025-07-06 RX ORDER — ONDANSETRON 4 MG/1
4 TABLET, FILM COATED ORAL EVERY 6 HOURS
Qty: 20 TABLET | Refills: 0 | Status: SHIPPED | OUTPATIENT
Start: 2025-07-06

## 2025-07-06 RX ORDER — DROPERIDOL 2.5 MG/ML
1.25 INJECTION, SOLUTION INTRAMUSCULAR; INTRAVENOUS ONCE
Status: COMPLETED | OUTPATIENT
Start: 2025-07-06 | End: 2025-07-06

## 2025-07-06 RX ORDER — DICYCLOMINE HCL 20 MG
20 TABLET ORAL ONCE
Status: COMPLETED | OUTPATIENT
Start: 2025-07-06 | End: 2025-07-06

## 2025-07-06 RX ORDER — METRONIDAZOLE 500 MG/1
500 TABLET ORAL EVERY 8 HOURS SCHEDULED
Qty: 30 TABLET | Refills: 0 | Status: SHIPPED | OUTPATIENT
Start: 2025-07-06 | End: 2025-07-16

## 2025-07-06 RX ORDER — HYDROMORPHONE HCL/PF 1 MG/ML
1 SYRINGE (ML) INJECTION ONCE
Refills: 0 | Status: COMPLETED | OUTPATIENT
Start: 2025-07-06 | End: 2025-07-06

## 2025-07-06 RX ORDER — METRONIDAZOLE 500 MG/1
500 TABLET ORAL ONCE
Status: COMPLETED | OUTPATIENT
Start: 2025-07-06 | End: 2025-07-06

## 2025-07-06 RX ADMIN — DROPERIDOL 1.25 MG: 2.5 INJECTION, SOLUTION INTRAMUSCULAR; INTRAVENOUS at 00:39

## 2025-07-06 RX ADMIN — HYDROMORPHONE HYDROCHLORIDE 1 MG: 1 INJECTION, SOLUTION INTRAMUSCULAR; INTRAVENOUS; SUBCUTANEOUS at 03:12

## 2025-07-06 RX ADMIN — HYDROMORPHONE HYDROCHLORIDE 1 MG: 1 INJECTION, SOLUTION INTRAMUSCULAR; INTRAVENOUS; SUBCUTANEOUS at 01:15

## 2025-07-06 RX ADMIN — SODIUM CHLORIDE 1000 ML: 0.9 INJECTION, SOLUTION INTRAVENOUS at 00:38

## 2025-07-06 RX ADMIN — SODIUM CHLORIDE 1000 ML: 0.9 INJECTION, SOLUTION INTRAVENOUS at 00:36

## 2025-07-06 RX ADMIN — DICYCLOMINE HYDROCHLORIDE 20 MG: 20 TABLET ORAL at 03:54

## 2025-07-06 RX ADMIN — METRONIDAZOLE 500 MG: 500 TABLET ORAL at 03:54

## 2025-07-06 NOTE — ED PROVIDER NOTES
Time reflects when diagnosis was documented in both MDM as applicable and the Disposition within this note       Time User Action Codes Description Comment    7/6/2025  3:45 AM Susan Connor [R10.30] Lower abdominal pain     7/6/2025  3:45 AM Susan Connor [K52.9] Enteritis           ED Disposition       ED Disposition   Discharge    Condition   Stable    Date/Time   Sun Jul 6, 2025  3:45 AM    Comment   Diane Hays discharge to home/self care.                   Assessment & Plan       Medical Decision Making  Abdominal exam without peritoneal signs.  No evidence of acute abdomen at this time.  Well-appearing.  Given work-up, low suspicion for acute hepatobiliary disease (including acute cholecystitis or cholangitis), acute pancreatitis (negative lipase), PUD (including gastric perforation), acute infectious processes (pneumonia, hepatitis, pyelonephritis), acute appendicitis, vascular catastrophe, bowel obstruction, viscus perforation, ovarian/testicular torsion, or diverticulitis.  Presentation not consistent with other acute emergent causes of abdominal pain at this time.  Patient with significant leukocytosis but CT scan findings show no signs of acute pathology, likely enteritis, will start on Flagyl and provide prescription for Bentyl for symptom control.    Problems Addressed:  Enteritis: acute illness or injury  Lower abdominal pain: acute illness or injury    Amount and/or Complexity of Data Reviewed  Labs: ordered. Decision-making details documented in ED Course.  Radiology: ordered. Decision-making details documented in ED Course.    Risk  OTC drugs.  Prescription drug management.        ED Course as of 07/06/25 0534   [place] Jul 06, 2025   0255 CBC and differential(!)   0255 Comprehensive metabolic panel(!)   0255 Lipase(!)   0255 Pregnancy, hCG, quantitative   0403 UA w Reflex to Microscopic w Reflex to Culture(!)       Medications   sodium chloride 0.9 % bolus 1,000 mL (0 mL Intravenous  Stopped 7/6/25 0204)   droperidol (INAPSINE) injection 1.25 mg (1.25 mg Intravenous Given 7/6/25 0039)   sodium chloride 0.9 % bolus 1,000 mL (0 mL Intravenous Stopped 7/6/25 0204)   HYDROmorphone (DILAUDID) injection 1 mg (1 mg Intravenous Given 7/6/25 0115)   HYDROmorphone (DILAUDID) injection 1 mg (1 mg Intravenous Given 7/6/25 0312)   dicyclomine (BENTYL) tablet 20 mg (20 mg Oral Given 7/6/25 0354)   metroNIDAZOLE (FLAGYL) tablet 500 mg (500 mg Oral Given 7/6/25 0354)       ED Risk Strat Scores                    No data recorded                            History of Present Illness       Chief Complaint   Patient presents with    Abdominal Pain     Pt started with RLQ pain that started a few hours ago. Pt is type 1 DM. 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 +n/v/diaphoretic/fevers. Pt states that it hurts when she breathes.        Past Medical History[1]   Past Surgical History[2]   Family History[3]   Social History[4]   E-Cigarette/Vaping    E-Cigarette Use Never User       E-Cigarette/Vaping Substances      I have reviewed and agree with the history as documented.     Patient is a 39-year-old female presenting to the emergency department complaining of acute onset right lower quadrant abdominal pain with nausea starting a few hours prior to coming to the ED, she denies any questionable food intake, no injuries, no fevers, no dysuria or hematuria, no sick contacts, has a previous history of chronic pancreatitis but had a pancreatectomy, also splenectomy and cholecystectomy, she denies any diarrhea, currently menstruating, previous history of ovarian cyst but no similar pain in the past        Review of Systems   Constitutional: Negative.    HENT: Negative.     Eyes: Negative.    Respiratory: Negative.     Cardiovascular: Negative.    Gastrointestinal:  Positive for abdominal pain and nausea.   Endocrine: Negative.    Genitourinary: Negative.    Musculoskeletal: Negative.    Skin: Negative.     Allergic/Immunologic: Negative.    Neurological: Negative.    Hematological: Negative.    Psychiatric/Behavioral: Negative.             Objective       ED Triage Vitals [07/06/25 0018]   Temperature Pulse Blood Pressure Respirations SpO2 Patient Position - Orthostatic VS   (!) 97.4 °F (36.3 °C) 85 127/86 18 97 % Lying      Temp Source Heart Rate Source BP Location FiO2 (%) Pain Score    Oral Monitor Right arm -- 10 - Worst Possible Pain      Vitals      Date and Time Temp Pulse SpO2 Resp BP Pain Score FACES Pain Rating User   07/06/25 0400 -- 82 97 % 16 124/66 -- -- AL   07/06/25 0315 -- 94 95 % 18 133/69 -- -- AL   07/06/25 0312 -- -- -- -- -- 8 -- AL   07/06/25 0300 -- 94 94 % 16 128/63 -- -- AL   07/06/25 0145 -- 80 95 % 18 123/62 -- -- AL   07/06/25 0116 -- -- -- -- -- 10 - Worst Possible Pain -- AL   07/06/25 0115 -- -- -- -- -- 10 - Worst Possible Pain -- AL   07/06/25 0045 -- 85 100 % 16 113/59 -- -- AL   07/06/25 0018 97.4 °F (36.3 °C) 85 97 % 18 127/86 10 - Worst Possible Pain -- SB            Physical Exam  Constitutional:       Appearance: Normal appearance. She is well-developed.   HENT:      Head: Normocephalic and atraumatic.     Eyes:      Conjunctiva/sclera: Conjunctivae normal.      Pupils: Pupils are equal, round, and reactive to light.       Cardiovascular:      Rate and Rhythm: Normal rate.   Pulmonary:      Effort: Pulmonary effort is normal.   Abdominal:      Tenderness: There is abdominal tenderness in the right lower quadrant.     Musculoskeletal:         General: Normal range of motion.      Cervical back: Normal range of motion.     Skin:     General: Skin is warm and dry.     Neurological:      Mental Status: She is alert and oriented to person, place, and time.     Psychiatric:         Behavior: Behavior normal.         Results Reviewed       Procedure Component Value Units Date/Time    UA w Reflex to Microscopic w Reflex to Culture [[id number]]  (Abnormal) Collected: 07/06/25 0310     Lab Status: Final result Specimen: Urine, Clean Catch Updated: 07/06/25 0317     Color, UA Yellow     Clarity, UA Clear     Specific Gravity, UA >=1.030     pH, UA 6.0     Leukocytes, UA Negative     Nitrite, UA Negative     Protein, UA Negative mg/dl      Glucose, UA Negative mg/dl      Ketones, UA 15 (1+) mg/dl      Urobilinogen, UA 0.2 E.U./dl      Bilirubin, UA Negative     Occult Blood, UA Negative    Pregnancy, hCG, quantitative [[id number]]  (Normal) Collected: 07/06/25 0035    Lab Status: Final result Specimen: Blood from Arm, Left Updated: 07/06/25 0116     HCG, Quant <0.6 mIU/mL     Narrative:       Expected Ranges:    HCG results between 5.0 and 25.0 mIU/mL may be indicative of early pregnancy but should be interpreted in light of the total clinical presentation.    HCG can rise to detectable levels in sanam and post menopausal women (0-11.6 mIU/mL).     Approximate               Approximate HCG  Gestation age          Concentration ( mIU/mL)  _____________          ______________________   Weeks                      HCG values  0.2-1                       5-50  1-2                           2-3                         100-5000  3-4                         500-40605  4-5                         1000-59912  5-6                         [id number]  6-8                         [id number]  8-12                        [id number]      Comprehensive metabolic panel [[id number]]  (Abnormal) Collected: 07/06/25 0035    Lab Status: Final result Specimen: Blood from Arm, Left Updated: 07/06/25 0108     Sodium 136 mmol/L      Potassium 3.6 mmol/L      Chloride 101 mmol/L      CO2 25 mmol/L      ANION GAP 10 mmol/L      BUN 19 mg/dL      Creatinine 1.08 mg/dL      Glucose 141 mg/dL      Calcium 9.5 mg/dL      AST 12 U/L      ALT 8 U/L      Alkaline Phosphatase 109 U/L      Total Protein 7.5 g/dL      Albumin 4.3 g/dL      Total Bilirubin 0.68 mg/dL      eGFR 64 ml/min/1.73sq m     Narrative:      National  Kidney Disease [employer] guidelines for Chronic Kidney Disease (CKD):     Stage 1 with normal or high GFR (GFR > 90 mL/min/1.73 square meters)    Stage 2 Mild CKD (GFR = 60-89 mL/min/1.73 square meters)    Stage 3A Moderate CKD (GFR = 45-59 mL/min/1.73 square meters)    Stage 3B Moderate CKD (GFR = 30-44 mL/min/1.73 square meters)    Stage 4 Severe CKD (GFR = 15-29 mL/min/1.73 square meters)    Stage 5 End Stage CKD (GFR <15 mL/min/1.73 square meters)  Note: GFR calculation is accurate only with a steady state creatinine    Lipase [[id number]]  (Abnormal) Collected: 07/06/25 0035    Lab Status: Final result Specimen: Blood from Arm, Left Updated: 07/06/25 0108     Lipase 7 u/L     CBC and differential [[id number]]  (Abnormal) Collected: 07/06/25 0035    Lab Status: Final result Specimen: Blood from Arm, Left Updated: 07/06/25 0053     WBC 21.83 Thousand/uL      RBC 4.68 Million/uL      Hemoglobin 14.2 g/dL      Hematocrit 43.1 %      MCV 92 fL      MCH 30.3 pg      MCHC 32.9 g/dL      RDW 15.9 %      MPV 9.5 fL      Platelets 793 Thousands/uL      nRBC 0 /100 WBCs      Segmented % 73 %      Immature Grans % 1 %      Lymphocytes % 19 %      Monocytes % 4 %      Eosinophils Relative 2 %      Basophils Relative 1 %      Absolute Neutrophils 16.09 Thousands/µL      Absolute Immature Grans 0.12 Thousand/uL      Absolute Lymphocytes 4.19 Thousands/µL      Absolute Monocytes 0.90 Thousand/µL      Eosinophils Absolute 0.42 Thousand/µL      Basophils Absolute 0.11 Thousands/µL             CT abdomen pelvis wo contrast   Final Interpretation by Fabricio Farmer MD (07/06 0330)      1.  Abnormal wall thickening of multiple proximal to mid small bowel loops suggesting enteritis. No evidence of bowel obstruction, appendicitis, or free air. Moderate amount of lower abdominal/pelvic free fluid is seen.   2.  New fat-containing umbilical hernia with hernia neck measuring 4.5 cm.   3.  Age-indeterminate superior plate T12 compression  fracture deformity is seen with mild loss of height. Recommend correlation with physical exam findings for point tenderness in this area to help better determine acuity.   4.  Postsurgical changes and other ancillary findings detailed above.      Workstation performed: [id number]             Procedures    ED Medication and Procedure Management   Prior to Admission Medications   Prescriptions Last Dose Informant Patient Reported? Taking?   B-D UF III MINI PEN NEEDLES 31G X 5 MM MISC  Self Yes No   SiXDAY   Continuous Blood Gluc Sensor (FreeStyle Maxwell 14 Day Sensor) MISC  Self Yes No   Sig: INJECT 1 EACH UNDER THE SKIN EVERY 14 (FOURTEEN) DAYS.   Creon 25205-63194 units   No No   Sig: TAKE 24,000 UNITS OF LIPASE BY MOUTH 3 (THREE) TIMES A DAY WITH MEALS   Diclofenac Sodium (VOLTAREN) 1 %  Self Yes No   Sig: Apply 2 g topically 4 (four) times a day   Levonorgestrel 20.1 MCG/DAY IUD  Self Yes No   Si each by Intrauterine route   Omega-3 Fatty Acids (fish oil) 1,000 mg  Self Yes No   Sig: Take 1 g by mouth 2 (two) times a day   atorvastatin (LIPITOR) 40 mg tablet  Self Yes No   Sig: Take 40 mg by mouth daily with dinner   bisacodyl (FLEET) 10 MG/30ML ENEM   No No   Sig: Insert 30 mL (10 mg total) into the rectum once for 1 dose   dicyclomine (BENTYL) 10 mg capsule   No No   Sig: TAKE 1 CAPSULE (10 MG TOTAL) BY MOUTH 4 TIMES A DAY AS NEEDED FOR ABDOMINAL PAIN   docusate sodium (COLACE) 100 mg capsule   No No   Sig: Take 1 capsule (100 mg total) by mouth 2 (two) times a day   insulin glargine (LANTUS) 100 units/mL subcutaneous injection  Self Yes No   Sig: Inject 38 Units under the skin every 12 (twelve) hours   insulin lispro (HumaLOG) 100 units/mL injection pen  Self Yes No   Si units three times a day  ISF 20 Target 120   lisinopril (ZESTRIL) 10 mg tablet  Self Yes No   Sig: Take 10 mg by mouth daily   metFORMIN (GLUCOPHAGE) 1000 MG tablet  Self Yes No   Sig: Take 1,000 mg by mouth 2 (two) times a day with  meals   metFORMIN (GLUCOPHAGE-XR) 500 mg 24 hr tablet   Yes No   Sig: Take 1,000 mg by mouth 2 (two) times a day with meals   ondansetron (Zofran ODT) 4 mg disintegrating tablet  Self No No   Sig: Take 1 tablet (4 mg total) by mouth every 6 (six) hours as needed for nausea or vomiting   pantoprazole (PROTONIX) 40 mg tablet   No No   Sig: TAKE 1 TABLET BY MOUTH 2 TIMES A DAY BEFORE MEALS.   polyethylene glycol (MIRALAX) 17 g packet   No No   Sig: Take 17 g by mouth daily   simvastatin (ZOCOR) 20 mg tablet  Self Yes No   Sig: Take 20 mg by mouth daily at bedtime   venlafaxine (EFFEXOR-XR) 150 mg 24 hr capsule  Self Yes No   Sig: Take 150 mg by mouth daily      Facility-Administered Medications: None     Discharge Medication List as of 7/6/2025  3:46 AM        START taking these medications    Details   dicyclomine (BENTYL) 20 mg tablet Take 1 tablet (20 mg total) by mouth 2 (two) times a day, Starting Sun 7/6/2025, Normal      metroNIDAZOLE (FLAGYL) 500 mg tablet Take 1 tablet (500 mg total) by mouth every 8 (eight) hours for 10 days, Starting Sun 7/6/2025, Until Wed 7/16/2025, Normal      ondansetron (ZOFRAN) 4 mg tablet Take 1 tablet (4 mg total) by mouth every 6 (six) hours, Starting Sun 7/6/2025, Normal           CONTINUE these medications which have NOT CHANGED    Details   atorvastatin (LIPITOR) 40 mg tablet Take 40 mg by mouth daily with dinner, Starting Sun 5/8/2022, Historical Med      B-D UF III MINI PEN NEEDLES 31G X 5 MM MISC 5XDAY, Historical Med      bisacodyl (FLEET) 10 MG/30ML ENEM Insert 30 mL (10 mg total) into the rectum once for 1 dose, Starting Thu 10/27/2022, Normal      Continuous Blood Gluc Sensor (FreeStyle Maxwell 14 Day Sensor) MISC INJECT 1 EACH UNDER THE SKIN EVERY 14 (FOURTEEN) DAYS., Historical Med      Creon [id number] units TAKE 24,000 UNITS OF LIPASE BY MOUTH 3 (THREE) TIMES A DAY WITH MEALS, Normal      Diclofenac Sodium (VOLTAREN) 1 % Apply 2 g topically 4 (four) times a day,  Starting Wed 7/27/2022, Until Thu 7/27/2023, Historical Med      dicyclomine (BENTYL) 10 mg capsule TAKE 1 CAPSULE (10 MG TOTAL) BY MOUTH 4 TIMES A DAY AS NEEDED FOR ABDOMINAL PAIN, Normal      docusate sodium (COLACE) 100 mg capsule Take 1 capsule (100 mg total) by mouth 2 (two) times a day, Starting Fri 12/23/2022, Until Wed 6/21/2023, Normal      insulin glargine (LANTUS) 100 units/mL subcutaneous injection Inject 38 Units under the skin every 12 (twelve) hours, Historical Med      insulin lispro (HumaLOG) 100 units/mL injection pen 12 units three times a day  ISF 20 Target 120, Historical Med      Levonorgestrel 20.1 MCG/DAY IUD 1 each by Intrauterine route, Historical Med      lisinopril (ZESTRIL) 10 mg tablet Take 10 mg by mouth daily, Historical Med      metFORMIN (GLUCOPHAGE) 1000 MG tablet Take 1,000 mg by mouth 2 (two) times a day with meals, Historical Med      metFORMIN (GLUCOPHAGE-XR) 500 mg 24 hr tablet Take 1,000 mg by mouth 2 (two) times a day with meals, Starting Mon 9/5/2022, Historical Med      Omega-3 Fatty Acids (fish oil) 1,000 mg Take 1 g by mouth 2 (two) times a day, Starting Tue 2/8/2022, Until Wed 2/8/2023, Historical Med      ondansetron (Zofran ODT) 4 mg disintegrating tablet Take 1 tablet (4 mg total) by mouth every 6 (six) hours as needed for nausea or vomiting, Starting Tue 9/27/2022, Normal      pantoprazole (PROTONIX) 40 mg tablet TAKE 1 TABLET BY MOUTH 2 TIMES A DAY BEFORE MEALS., Normal      polyethylene glycol (MIRALAX) 17 g packet Take 17 g by mouth daily, Starting Thu 10/27/2022, Until Tue 4/25/2023, Normal      simvastatin (ZOCOR) 20 mg tablet Take 20 mg by mouth daily at bedtime, Historical Med      venlafaxine (EFFEXOR-XR) 150 mg 24 hr capsule Take 150 mg by mouth daily, Historical Med           No discharge procedures on file.  ED SEPSIS DOCUMENTATION   Time reflects when diagnosis was documented in both MDM as applicable and the Disposition within this note       Time User  Action Codes Description Comment    2025  3:45 AM Susan Connor [R10.30] Lower abdominal pain     2025  3:45 AM Susan Connor [K52.9] Enteritis                      [1]   Past Medical History:  Diagnosis Date    Alopecia     Bell's palsy     Cholecystitis     Chronic pancreatitis (HCC)     Diabetes mellitus (HCC)     High cholesterol     Hypertension    [2]   Past Surgical History:  Procedure Laterality Date    CARPAL TUNNEL RELEASE       SECTION      CHOLECYSTECTOMY      PANCREATECTOMY      SPLENECTOMY      UPPER GASTROINTESTINAL ENDOSCOPY      US GUIDED THYROID BIOPSY  2024   [3] No family history on file.  [4]   Social History  Tobacco Use    Smoking status: Every Day     Current packs/day: 0.50     Types: Cigarettes    Smokeless tobacco: Never   Vaping Use    Vaping status: Never Used   Substance Use Topics    Alcohol use: Yes     Comment: SOCIAL    Drug use: Never        Susan Connor DO  25 0534